# Patient Record
Sex: FEMALE | Race: BLACK OR AFRICAN AMERICAN | NOT HISPANIC OR LATINO | Employment: OTHER | URBAN - METROPOLITAN AREA
[De-identification: names, ages, dates, MRNs, and addresses within clinical notes are randomized per-mention and may not be internally consistent; named-entity substitution may affect disease eponyms.]

---

## 2017-05-08 ENCOUNTER — OFFICE VISIT (OUTPATIENT)
Dept: FAMILY MEDICINE CLINIC | Facility: CLINIC | Age: 62
End: 2017-05-08

## 2017-05-08 ENCOUNTER — RESULTS ENCOUNTER (OUTPATIENT)
Dept: FAMILY MEDICINE CLINIC | Facility: CLINIC | Age: 62
End: 2017-05-08

## 2017-05-08 DIAGNOSIS — E11.9 CONTROLLED TYPE 2 DIABETES MELLITUS WITHOUT COMPLICATION, WITHOUT LONG-TERM CURRENT USE OF INSULIN (HCC): ICD-10-CM

## 2017-05-08 DIAGNOSIS — E11.9 CONTROLLED TYPE 2 DIABETES MELLITUS WITHOUT COMPLICATION, WITHOUT LONG-TERM CURRENT USE OF INSULIN (HCC): Primary | ICD-10-CM

## 2017-05-08 DIAGNOSIS — I10 BENIGN ESSENTIAL HYPERTENSION: ICD-10-CM

## 2017-05-08 DIAGNOSIS — Z00.00 HEALTHCARE MAINTENANCE: ICD-10-CM

## 2017-05-08 DIAGNOSIS — E03.8 ADULT ONSET HYPOTHYROIDISM: ICD-10-CM

## 2017-05-08 PROCEDURE — 90736 HZV VACCINE LIVE SUBQ: CPT | Performed by: FAMILY MEDICINE

## 2017-05-08 PROCEDURE — 99203 OFFICE O/P NEW LOW 30 MIN: CPT | Performed by: FAMILY MEDICINE

## 2017-05-08 PROCEDURE — 90471 IMMUNIZATION ADMIN: CPT | Performed by: FAMILY MEDICINE

## 2017-05-08 RX ORDER — METOPROLOL SUCCINATE 50 MG/1
TABLET, EXTENDED RELEASE ORAL
COMMUNITY
Start: 2017-04-12 | End: 2017-11-13 | Stop reason: SDUPTHER

## 2017-05-08 RX ORDER — VALSARTAN 160 MG/1
TABLET ORAL
COMMUNITY
Start: 2017-03-16 | End: 2017-06-16 | Stop reason: SDUPTHER

## 2017-05-08 RX ORDER — METFORMIN HYDROCHLORIDE 750 MG/1
750 TABLET, EXTENDED RELEASE ORAL
COMMUNITY
End: 2019-06-05 | Stop reason: SDUPTHER

## 2017-05-08 RX ORDER — LEVOTHYROXINE SODIUM 137 UG/1
137 TABLET ORAL DAILY
COMMUNITY
End: 2017-06-19 | Stop reason: SDUPTHER

## 2017-05-08 RX ORDER — AMLODIPINE BESYLATE 10 MG/1
TABLET ORAL
COMMUNITY
Start: 2017-03-16 | End: 2017-06-16 | Stop reason: SDUPTHER

## 2017-05-08 RX ORDER — LANOLIN ALCOHOL/MO/W.PET/CERES
1000 CREAM (GRAM) TOPICAL DAILY
COMMUNITY
End: 2019-12-16

## 2017-05-08 RX ORDER — CHOLECALCIFEROL (VITAMIN D3) 125 MCG
1 CAPSULE ORAL DAILY
COMMUNITY

## 2017-05-08 RX ORDER — VITAMIN E 268 MG
400 CAPSULE ORAL DAILY
COMMUNITY
End: 2019-12-16

## 2017-05-09 VITALS
HEIGHT: 64 IN | BODY MASS INDEX: 32.44 KG/M2 | SYSTOLIC BLOOD PRESSURE: 130 MMHG | OXYGEN SATURATION: 99 % | HEART RATE: 62 BPM | WEIGHT: 190 LBS | DIASTOLIC BLOOD PRESSURE: 80 MMHG

## 2017-05-10 ENCOUNTER — TELEPHONE (OUTPATIENT)
Dept: FAMILY MEDICINE CLINIC | Facility: CLINIC | Age: 62
End: 2017-05-10

## 2017-05-15 ENCOUNTER — RESULTS ENCOUNTER (OUTPATIENT)
Dept: FAMILY MEDICINE CLINIC | Facility: CLINIC | Age: 62
End: 2017-05-15

## 2017-05-15 DIAGNOSIS — Z00.00 HEALTHCARE MAINTENANCE: ICD-10-CM

## 2017-05-15 DIAGNOSIS — I10 BENIGN ESSENTIAL HYPERTENSION: ICD-10-CM

## 2017-05-15 DIAGNOSIS — E11.9 CONTROLLED TYPE 2 DIABETES MELLITUS WITHOUT COMPLICATION, WITHOUT LONG-TERM CURRENT USE OF INSULIN (HCC): ICD-10-CM

## 2017-06-16 RX ORDER — AMLODIPINE BESYLATE 10 MG/1
10 TABLET ORAL DAILY
Qty: 90 TABLET | Refills: 0 | Status: SHIPPED | OUTPATIENT
Start: 2017-06-16 | End: 2017-09-19 | Stop reason: SDUPTHER

## 2017-06-16 RX ORDER — VALSARTAN 160 MG/1
160 TABLET ORAL DAILY
Qty: 90 TABLET | Refills: 0 | Status: SHIPPED | OUTPATIENT
Start: 2017-06-16 | End: 2017-09-19 | Stop reason: SDUPTHER

## 2017-06-19 RX ORDER — LEVOTHYROXINE SODIUM 137 UG/1
137 TABLET ORAL DAILY
Qty: 90 TABLET | Refills: 1 | Status: SHIPPED | OUTPATIENT
Start: 2017-06-19 | End: 2018-05-16 | Stop reason: SDUPTHER

## 2017-09-19 RX ORDER — AMLODIPINE BESYLATE 10 MG/1
TABLET ORAL
Qty: 90 TABLET | Refills: 0 | Status: SHIPPED | OUTPATIENT
Start: 2017-09-19 | End: 2017-12-29 | Stop reason: SDUPTHER

## 2017-09-19 RX ORDER — VALSARTAN 160 MG/1
TABLET ORAL
Qty: 90 TABLET | Refills: 0 | Status: SHIPPED | OUTPATIENT
Start: 2017-09-19 | End: 2017-12-29 | Stop reason: SDUPTHER

## 2017-11-13 ENCOUNTER — TELEPHONE (OUTPATIENT)
Dept: FAMILY MEDICINE CLINIC | Facility: CLINIC | Age: 62
End: 2017-11-13

## 2017-11-13 RX ORDER — METOPROLOL SUCCINATE 50 MG/1
50 TABLET, EXTENDED RELEASE ORAL DAILY
Qty: 30 TABLET | Refills: 0 | Status: SHIPPED | OUTPATIENT
Start: 2017-11-13 | End: 2017-12-04 | Stop reason: SDUPTHER

## 2017-11-13 NOTE — TELEPHONE ENCOUNTER
Patient called regarding blood pressure medication. She states that she does not have refills. Patient was informed that she needs an appointment for hypertension. A 30 day supply was sent in.

## 2017-12-04 ENCOUNTER — OFFICE VISIT (OUTPATIENT)
Dept: FAMILY MEDICINE CLINIC | Facility: CLINIC | Age: 62
End: 2017-12-04

## 2017-12-04 VITALS
HEART RATE: 75 BPM | WEIGHT: 190 LBS | BODY MASS INDEX: 32.44 KG/M2 | SYSTOLIC BLOOD PRESSURE: 114 MMHG | DIASTOLIC BLOOD PRESSURE: 78 MMHG | HEIGHT: 64 IN | OXYGEN SATURATION: 99 %

## 2017-12-04 DIAGNOSIS — K21.00 GASTROESOPHAGEAL REFLUX DISEASE WITH ESOPHAGITIS: ICD-10-CM

## 2017-12-04 DIAGNOSIS — I10 BENIGN ESSENTIAL HYPERTENSION: Primary | ICD-10-CM

## 2017-12-04 PROCEDURE — 90471 IMMUNIZATION ADMIN: CPT | Performed by: FAMILY MEDICINE

## 2017-12-04 PROCEDURE — 90686 IIV4 VACC NO PRSV 0.5 ML IM: CPT | Performed by: FAMILY MEDICINE

## 2017-12-04 PROCEDURE — 99213 OFFICE O/P EST LOW 20 MIN: CPT | Performed by: FAMILY MEDICINE

## 2017-12-04 RX ORDER — METOPROLOL SUCCINATE 50 MG/1
50 TABLET, EXTENDED RELEASE ORAL DAILY
Qty: 90 TABLET | Refills: 0 | Status: SHIPPED | OUTPATIENT
Start: 2017-12-04 | End: 2018-04-30 | Stop reason: SDUPTHER

## 2017-12-04 RX ORDER — RANITIDINE 300 MG/1
300 TABLET ORAL NIGHTLY
Qty: 90 TABLET | Refills: 1 | Status: SHIPPED | OUTPATIENT
Start: 2017-12-04 | End: 2018-03-13 | Stop reason: ALTCHOICE

## 2017-12-04 NOTE — PROGRESS NOTES
Micheline Cervantes is a 62 y.o. female.      Assessment/Plan   Problem List Items Addressed This Visit        Cardiovascular and Mediastinum    Benign essential hypertension - Primary    Overview     [facility] 5/8/2017  BP appears controlled on multiple medications.          Relevant Medications    metoprolol succinate XL (TOPROL-XL) 50 MG 24 hr tablet       Digestive    Gastroesophageal reflux disease with esophagitis    Overview     [facility] 12/4/2017  She recently was in the ER with this. Was begun on prevacid but she didn't take it due to concerns re her kidneys. Will try an H2 blocker.          Relevant Medications    raNITIdine (ZANTAC) 300 MG tablet             Return in about 6 months (around 6/4/2018) for RTO labs. .  Patient Instructions   She will add liquid antacid instead of tums.       Chief Complaint   Patient presents with   • Hypertension   • Med Refill     Social History   Substance Use Topics   • Smoking status: Never Smoker   • Smokeless tobacco: Never Used   • Alcohol use None       History of Present Illness     She sometimes gets a pain in her RUQ, comes and goes. Not related to meals. No N, V or diarrhea.     She has not yet had her labs done for HbA1c and others. She is eating healthily. She has gained some weight with her part-time job at night -- she comes home at night and eats.     She went to the ER with CP. She is sleeping sitting up. Didn't want to take the Prevacid because of her kidneys. She has gained some weight recently.     The following portions of the patient's history were reviewed and updated as appropriate:PMHroutine: Social history , Allergies, Current Medications, Active Problem List and Health Maintenance    Review of Systems   Constitutional: Negative for activity change, appetite change, chills, fatigue, fever and unexpected weight change.   HENT: Negative for congestion, ear pain, hearing loss, nosebleeds, rhinorrhea and sore throat.    Eyes: Negative for pain, redness and  "visual disturbance.   Respiratory: Negative for cough, shortness of breath and wheezing.    Cardiovascular: Negative for chest pain, palpitations and leg swelling.   Gastrointestinal: Negative for abdominal pain, blood in stool, constipation, diarrhea, nausea and vomiting.   Endocrine: Negative for cold intolerance and heat intolerance.   Genitourinary: Negative for difficulty urinating, dysuria, frequency, hematuria, pelvic pain, urgency and vaginal discharge.   Musculoskeletal: Negative for arthralgias, back pain and joint swelling.   Skin: Negative for rash and wound.   Neurological: Negative for dizziness, weakness, numbness and headaches.   Hematological: Does not bruise/bleed easily.   Psychiatric/Behavioral: Negative for dysphoric mood, sleep disturbance and suicidal ideas. The patient is not nervous/anxious.        Objective   Vitals:    12/04/17 1318   BP: 114/78   Pulse: 75   SpO2: 99%   Weight: 190 lb (86.2 kg)   Height: 64\" (162.6 cm)     Body mass index is 32.61 kg/(m^2).  Physical Exam   Constitutional: She is oriented to person, place, and time. Vital signs are normal. She appears well-developed and well-nourished. No distress.   HENT:   Head: Normocephalic.   Cardiovascular: Normal rate, regular rhythm and normal heart sounds.    Pulmonary/Chest: Effort normal and breath sounds normal.   Neurological: She is alert and oriented to person, place, and time. Gait normal.   Psychiatric: She has a normal mood and affect. Her behavior is normal. Judgment and thought content normal.   Vitals reviewed.    Reviewed Data:  No results found for any previous visit.      "

## 2017-12-12 DIAGNOSIS — Z00.00 PREVENTATIVE HEALTH CARE: ICD-10-CM

## 2017-12-12 DIAGNOSIS — E11.9 TYPE 2 DIABETES MELLITUS WITHOUT COMPLICATION, WITHOUT LONG-TERM CURRENT USE OF INSULIN (HCC): Primary | ICD-10-CM

## 2017-12-12 DIAGNOSIS — I10 ESSENTIAL HYPERTENSION, BENIGN: ICD-10-CM

## 2017-12-12 LAB
ALBUMIN SERPL-MCNC: 4.7 G/DL (ref 3.5–5.2)
ALBUMIN/GLOB SERPL: 1.7 G/DL
ALP SERPL-CCNC: 134 U/L (ref 39–117)
ALT SERPL-CCNC: 61 U/L (ref 1–33)
AST SERPL-CCNC: 33 U/L (ref 1–32)
BASOPHILS # BLD AUTO: 0.06 10*3/MM3 (ref 0–0.2)
BASOPHILS NFR BLD AUTO: 0.9 % (ref 0–1.5)
BILIRUB SERPL-MCNC: 0.3 MG/DL (ref 0.1–1.2)
BUN SERPL-MCNC: 10 MG/DL (ref 8–23)
BUN/CREAT SERPL: 16.9 (ref 7–25)
CALCIUM SERPL-MCNC: 9.9 MG/DL (ref 8.6–10.5)
CHLORIDE SERPL-SCNC: 103 MMOL/L (ref 98–107)
CHOLEST SERPL-MCNC: 223 MG/DL (ref 0–200)
CO2 SERPL-SCNC: 26.3 MMOL/L (ref 22–29)
CREAT SERPL-MCNC: 0.59 MG/DL (ref 0.57–1)
EOSINOPHIL # BLD AUTO: 0.21 10*3/MM3 (ref 0–0.7)
EOSINOPHIL NFR BLD AUTO: 3.3 % (ref 0.3–6.2)
ERYTHROCYTE [DISTWIDTH] IN BLOOD BY AUTOMATED COUNT: 12.6 % (ref 11.7–13)
GFR SERPLBLD CREATININE-BSD FMLA CKD-EPI: 103 ML/MIN/1.73
GFR SERPLBLD CREATININE-BSD FMLA CKD-EPI: 125 ML/MIN/1.73
GLOBULIN SER CALC-MCNC: 2.7 GM/DL
GLUCOSE SERPL-MCNC: 112 MG/DL (ref 65–99)
HBA1C MFR BLD: 6.4 % (ref 4.8–5.6)
HCT VFR BLD AUTO: 41.6 % (ref 35.6–45.5)
HDLC SERPL-MCNC: 65 MG/DL (ref 40–60)
HGB BLD-MCNC: 12.9 G/DL (ref 11.9–15.5)
IMM GRANULOCYTES # BLD: 0.03 10*3/MM3 (ref 0–0.03)
IMM GRANULOCYTES NFR BLD: 0.5 % (ref 0–0.5)
LDLC SERPL CALC-MCNC: 124 MG/DL (ref 0–100)
LDLC/HDLC SERPL: 1.9 {RATIO}
LYMPHOCYTES # BLD AUTO: 2.46 10*3/MM3 (ref 0.9–4.8)
LYMPHOCYTES NFR BLD AUTO: 38.2 % (ref 19.6–45.3)
MCH RBC QN AUTO: 31.9 PG (ref 26.9–32)
MCHC RBC AUTO-ENTMCNC: 31 G/DL (ref 32.4–36.3)
MCV RBC AUTO: 102.7 FL (ref 80.5–98.2)
MONOCYTES # BLD AUTO: 0.46 10*3/MM3 (ref 0.2–1.2)
MONOCYTES NFR BLD AUTO: 7.1 % (ref 5–12)
NEUTROPHILS # BLD AUTO: 3.22 10*3/MM3 (ref 1.9–8.1)
NEUTROPHILS NFR BLD AUTO: 50 % (ref 42.7–76)
PLATELET # BLD AUTO: 395 10*3/MM3 (ref 140–500)
POTASSIUM SERPL-SCNC: 4.2 MMOL/L (ref 3.5–5.2)
PROT SERPL-MCNC: 7.4 G/DL (ref 6–8.5)
RBC # BLD AUTO: 4.05 10*6/MM3 (ref 3.9–5.2)
SODIUM SERPL-SCNC: 142 MMOL/L (ref 136–145)
TRIGL SERPL-MCNC: 171 MG/DL (ref 0–150)
VLDLC SERPL CALC-MCNC: 34.2 MG/DL (ref 5–40)
WBC # BLD AUTO: 6.44 10*3/MM3 (ref 4.5–10.7)

## 2017-12-13 LAB — HCV AB S/CO SERPL IA: <0.1 S/CO RATIO (ref 0–0.9)

## 2017-12-29 RX ORDER — VALSARTAN 160 MG/1
TABLET ORAL
Qty: 90 TABLET | Refills: 0 | Status: SHIPPED | OUTPATIENT
Start: 2017-12-29 | End: 2018-04-08 | Stop reason: SDUPTHER

## 2017-12-29 RX ORDER — AMLODIPINE BESYLATE 10 MG/1
TABLET ORAL
Qty: 90 TABLET | Refills: 0 | Status: SHIPPED | OUTPATIENT
Start: 2017-12-29 | End: 2018-04-08 | Stop reason: SDUPTHER

## 2018-02-09 ENCOUNTER — OFFICE VISIT (OUTPATIENT)
Dept: FAMILY MEDICINE CLINIC | Facility: CLINIC | Age: 63
End: 2018-02-09

## 2018-02-09 VITALS
OXYGEN SATURATION: 98 % | DIASTOLIC BLOOD PRESSURE: 78 MMHG | WEIGHT: 196 LBS | BODY MASS INDEX: 33.46 KG/M2 | SYSTOLIC BLOOD PRESSURE: 120 MMHG | HEART RATE: 78 BPM | HEIGHT: 64 IN

## 2018-02-09 DIAGNOSIS — I10 BENIGN ESSENTIAL HYPERTENSION: ICD-10-CM

## 2018-02-09 DIAGNOSIS — K21.00 REFLUX ESOPHAGITIS: ICD-10-CM

## 2018-02-09 DIAGNOSIS — E78.2 MIXED HYPERLIPIDEMIA: ICD-10-CM

## 2018-02-09 DIAGNOSIS — E11.9 CONTROLLED TYPE 2 DIABETES MELLITUS WITHOUT COMPLICATION, WITHOUT LONG-TERM CURRENT USE OF INSULIN (HCC): ICD-10-CM

## 2018-02-09 DIAGNOSIS — E03.8 ADULT ONSET HYPOTHYROIDISM: Primary | ICD-10-CM

## 2018-02-09 PROCEDURE — 99214 OFFICE O/P EST MOD 30 MIN: CPT | Performed by: FAMILY MEDICINE

## 2018-02-09 NOTE — PROGRESS NOTES
Micheline Cervantes is a 63 y.o. female.      Assessment/Plan   Problem List Items Addressed This Visit        Cardiovascular and Mediastinum    Benign essential hypertension    Overview     [facility] 2/9/2018  BP appears controlled on multiple medications.          Mixed hyperlipidemia    Overview     [facility] 2/9/2018  Patient adamantly refuses to take a statin. She understands that this increases her risk of stroke and heart disease. She does have a good ratio.   Lab Results   Component Value Date/Time     (H) 12/12/2017 01:40 PM                 Endocrine    Adult onset hypothyroidism - Primary    [facility]     [facility]  Sees Dr. Yonatan Beckham for this but has not been seen in over a year. Will check TSH today.          Relevant Orders    TSH    Diabetes mellitus type 2, controlled    [facility]     [facility]  Reviewed labs. DM is controlled but she has gained weight. She understands the concern with this. She wishes to redouble her efforts to exercise and lose weight.  [facility] 5/8/2017  I have significant concerns that she is adament about never taking a statin with her DM and apparently high lipids as measured in the past. She is unaware of what her most recent A1c. Need old records and new labs.  OVERVIEW:  She has been dx'd for about 8 years.          Relevant Orders    Microalbumin / Creatinine Urine Ratio - Urine, Clean Catch      Other Visit Diagnoses     Reflux esophagitis        Relevant Orders    Ambulatory Referral to Gastroenterology             Return in about 6 months (around 8/9/2018) for lab with next visit.  There are no Patient Instructions on file for this visit.    Chief Complaint   Patient presents with   • Discuss Labs   • Heartburn     Social History   Substance Use Topics   • Smoking status: Never Smoker   • Smokeless tobacco: Never Used   • Alcohol use None       History of Present Illness     Subjective   Patient is here for follow-up of evaluation and treatment of, HTN,  "DM2, Hyperlipidemia and obesity    Diabetes Mellitus Type II    Current symptoms/problems include none and have been unchanged.   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 no  Is She on ACE inhibitor or angiotensin II receptor blocker? Yes     HYPERTENSION  She is not exercising and is adherent to a low-salt diet. Patient does not check BP at home.. Patient denies chest pain and dyspnea. Use of agents associated with hypertension: none. History of target organ damage: none. She is compliant with meds.      LIPIDS  LDL result does not yet meet goal, HDL high, triglycerides high, liver functions are mildly abnormal, but stable    Most recent labs  Lab Results   Component Value Date    HGBA1C 6.40 (H) 12/12/2017    CREATININE 0.59 12/12/2017     (H) 12/12/2017        The following portions of the patient's history were reviewed and updated as appropriate:PMHroutine: Social history , Allergies, Current Medications, Active Problem List and Health Maintenance    Review of Systems   Constitutional: Negative for activity change, appetite change, chills, fatigue, fever and unexpected weight change.   HENT: Negative for congestion, ear pain, hearing loss, nosebleeds, rhinorrhea and sore throat.    Eyes: Negative for pain, redness and visual disturbance.   Respiratory: Negative for cough, shortness of breath and wheezing.    Cardiovascular: Negative for chest pain, palpitations and leg swelling.   Gastrointestinal: Positive for abdominal pain. Negative for blood in stool, constipation, " "diarrhea, nausea and vomiting.   Endocrine: Negative for cold intolerance and heat intolerance.   Genitourinary: Negative for difficulty urinating, dysuria, frequency, hematuria, pelvic pain, urgency and vaginal discharge.   Musculoskeletal: Negative for arthralgias, back pain and joint swelling.   Skin: Negative for rash and wound.   Neurological: Negative for dizziness, weakness, numbness and headaches.   Hematological: Does not bruise/bleed easily.   Psychiatric/Behavioral: Negative for dysphoric mood, sleep disturbance and suicidal ideas. The patient is not nervous/anxious.        Objective   Vitals:    02/09/18 1440   BP: 120/78   Pulse: 78   SpO2: 98%   Weight: 88.9 kg (196 lb)   Height: 162.6 cm (64.02\")     Body mass index is 33.63 kg/(m^2).  Physical Exam   Constitutional: She is oriented to person, place, and time. Vital signs are normal. She appears well-developed and well-nourished. No distress.   HENT:   Head: Normocephalic.   Cardiovascular: Normal rate, regular rhythm and normal heart sounds.    Neurological: She is alert and oriented to person, place, and time. Gait normal.   Psychiatric: She has a normal mood and affect. Her behavior is normal. Judgment and thought content normal.   Vitals reviewed.    Reviewed Data:  No visits with results within 1 Month(s) from this visit.  Latest known visit with results is:    Orders Only on 12/12/2017   Component Date Value Ref Range Status   • WBC 12/12/2017 6.44  4.50 - 10.70 10*3/mm3 Final   • RBC 12/12/2017 4.05  3.90 - 5.20 10*6/mm3 Final   • Hemoglobin 12/12/2017 12.9  11.9 - 15.5 g/dL Final   • Hematocrit 12/12/2017 41.6  35.6 - 45.5 % Final   • MCV 12/12/2017 102.7* 80.5 - 98.2 fL Final   • MCH 12/12/2017 31.9  26.9 - 32.0 pg Final   • MCHC 12/12/2017 31.0* 32.4 - 36.3 g/dL Final   • RDW 12/12/2017 12.6  11.7 - 13.0 % Final   • Platelets 12/12/2017 395  140 - 500 10*3/mm3 Final   • Neutrophil Rel % 12/12/2017 50.0  42.7 - 76.0 % Final   • Lymphocyte Rel " % 12/12/2017 38.2  19.6 - 45.3 % Final   • Monocyte Rel % 12/12/2017 7.1  5.0 - 12.0 % Final   • Eosinophil Rel % 12/12/2017 3.3  0.3 - 6.2 % Final   • Basophil Rel % 12/12/2017 0.9  0.0 - 1.5 % Final   • Neutrophils Absolute 12/12/2017 3.22  1.90 - 8.10 10*3/mm3 Final   • Lymphocytes Absolute 12/12/2017 2.46  0.90 - 4.80 10*3/mm3 Final   • Monocytes Absolute 12/12/2017 0.46  0.20 - 1.20 10*3/mm3 Final   • Eosinophils Absolute 12/12/2017 0.21  0.00 - 0.70 10*3/mm3 Final   • Basophils Absolute 12/12/2017 0.06  0.00 - 0.20 10*3/mm3 Final   • Immature Granulocyte Rel % 12/12/2017 0.5  0.0 - 0.5 % Final   • Immature Grans Absolute 12/12/2017 0.03  0.00 - 0.03 10*3/mm3 Final   • Glucose 12/12/2017 112* 65 - 99 mg/dL Final   • BUN 12/12/2017 10  8 - 23 mg/dL Final   • Creatinine 12/12/2017 0.59  0.57 - 1.00 mg/dL Final   • eGFR Non African Am 12/12/2017 103  >60 mL/min/1.73 Final   • eGFR African Am 12/12/2017 125  >60 mL/min/1.73 Final   • BUN/Creatinine Ratio 12/12/2017 16.9  7.0 - 25.0 Final   • Sodium 12/12/2017 142  136 - 145 mmol/L Final   • Potassium 12/12/2017 4.2  3.5 - 5.2 mmol/L Final   • Chloride 12/12/2017 103  98 - 107 mmol/L Final   • Total CO2 12/12/2017 26.3  22.0 - 29.0 mmol/L Final   • Calcium 12/12/2017 9.9  8.6 - 10.5 mg/dL Final   • Total Protein 12/12/2017 7.4  6.0 - 8.5 g/dL Final   • Albumin 12/12/2017 4.70  3.50 - 5.20 g/dL Final   • Globulin 12/12/2017 2.7  gm/dL Final   • A/G Ratio 12/12/2017 1.7  g/dL Final   • Total Bilirubin 12/12/2017 0.3  0.1 - 1.2 mg/dL Final   • Alkaline Phosphatase 12/12/2017 134* 39 - 117 U/L Final   • AST (SGOT) 12/12/2017 33* 1 - 32 U/L Final   • ALT (SGPT) 12/12/2017 61* 1 - 33 U/L Final   • Total Cholesterol 12/12/2017 223* 0 - 200 mg/dL Final   • Triglycerides 12/12/2017 171* 0 - 150 mg/dL Final   • HDL Cholesterol 12/12/2017 65* 40 - 60 mg/dL Final   • VLDL Cholesterol 12/12/2017 34.2  5 - 40 mg/dL Final   • LDL Cholesterol  12/12/2017 124* 0 - 100 mg/dL Final    • LDL/HDL Ratio 12/12/2017 1.90   Final   • Hep C Virus Ab 12/12/2017 <0.1  0.0 - 0.9 s/co ratio Final    Comment:                                   Negative:     < 0.8                               Indeterminate: 0.8 - 0.9                                    Positive:     > 0.9   The CDC recommends that a positive HCV antibody result   be followed up with a HCV Nucleic Acid Amplification   test ([id number]).     • Hemoglobin A1C 12/12/2017 6.40* 4.80 - 5.60 % Final    Comment: Hemoglobin A1C Ranges:  Increased Risk for Diabetes  5.7% to 6.4%  Diabetes                     >= 6.5%  Diabetic Goal                < 7.0%

## 2018-02-10 LAB
ALBUMIN/CREAT UR: 15.6 MG/G CREAT (ref 0–30)
CREAT UR-MCNC: 256.1 MG/DL
MICROALBUMIN UR-MCNC: 40 UG/ML
TSH SERPL DL<=0.005 MIU/L-ACNC: 1.78 UIU/ML (ref 0.45–4.5)

## 2018-02-27 ENCOUNTER — OFFICE VISIT (OUTPATIENT)
Dept: GASTROENTEROLOGY | Facility: CLINIC | Age: 63
End: 2018-02-27

## 2018-02-27 VITALS
HEIGHT: 64 IN | DIASTOLIC BLOOD PRESSURE: 84 MMHG | TEMPERATURE: 98.2 F | BODY MASS INDEX: 33.09 KG/M2 | WEIGHT: 193.8 LBS | SYSTOLIC BLOOD PRESSURE: 130 MMHG

## 2018-02-27 DIAGNOSIS — R10.11 RUQ PAIN: ICD-10-CM

## 2018-02-27 DIAGNOSIS — R74.8 ELEVATED LIVER ENZYMES: ICD-10-CM

## 2018-02-27 DIAGNOSIS — K21.00 GASTROESOPHAGEAL REFLUX DISEASE WITH ESOPHAGITIS: Primary | ICD-10-CM

## 2018-02-27 PROCEDURE — 99204 OFFICE O/P NEW MOD 45 MIN: CPT | Performed by: INTERNAL MEDICINE

## 2018-03-13 ENCOUNTER — OFFICE VISIT (OUTPATIENT)
Dept: FAMILY MEDICINE CLINIC | Facility: CLINIC | Age: 63
End: 2018-03-13

## 2018-03-13 VITALS
RESPIRATION RATE: 16 BRPM | HEART RATE: 86 BPM | TEMPERATURE: 97.4 F | OXYGEN SATURATION: 97 % | BODY MASS INDEX: 32.27 KG/M2 | DIASTOLIC BLOOD PRESSURE: 82 MMHG | SYSTOLIC BLOOD PRESSURE: 136 MMHG | HEIGHT: 64 IN | WEIGHT: 189 LBS

## 2018-03-13 DIAGNOSIS — R05.3 PERSISTENT COUGH: Primary | ICD-10-CM

## 2018-03-13 PROCEDURE — 99213 OFFICE O/P EST LOW 20 MIN: CPT | Performed by: NURSE PRACTITIONER

## 2018-03-13 RX ORDER — METHYLPREDNISOLONE 4 MG/1
TABLET ORAL
Qty: 1 EACH | Refills: 0 | Status: SHIPPED | OUTPATIENT
Start: 2018-03-13 | End: 2018-04-16

## 2018-03-13 RX ORDER — BENZONATATE 100 MG/1
200 CAPSULE ORAL 3 TIMES DAILY PRN
Qty: 30 CAPSULE | Refills: 0 | Status: SHIPPED | OUTPATIENT
Start: 2018-03-13 | End: 2018-04-16

## 2018-03-13 NOTE — PROGRESS NOTES
"Micheline Cervantes is a 63 y.o. female. Pt admits to have sinus pressure, chest congestion and productive cough. She states she can ear wheezing when she breath. No fever,using corecidin HP with minimal success      Assessment/Plan   Problem List Items Addressed This Visit     None      Visit Diagnoses     Persistent cough    -  Primary      Dr. Blum pt       No Follow-up on file.  There are no Patient Instructions on file for this visit.    Chief Complaint   Patient presents with   • Cough   • Sinusitis     Social History   Substance Use Topics   • Smoking status: Never Smoker   • Smokeless tobacco: Never Used   • Alcohol use No       Cough   Associated symptoms include ear pain and wheezing. Pertinent negatives include no fever or shortness of breath.   Sinusitis   Associated symptoms include congestion, coughing, ear pain and sinus pressure. Pertinent negatives include no shortness of breath.        The following portions of the patient's history were reviewed and updated as appropriate:PMHroutine: Social history , Allergies and Current Medications    Review of Systems   Constitutional: Positive for activity change and appetite change. Negative for fatigue and fever.   HENT: Positive for congestion, ear pain, sinus pain and sinus pressure.    Respiratory: Positive for cough and wheezing. Negative for shortness of breath.        Objective   Vitals:    03/13/18 1459   BP: 136/82   BP Location: Left arm   Pulse: 86   Resp: 16   Temp: 97.4 °F (36.3 °C)   SpO2: 97%   Weight: 85.7 kg (189 lb)   Height: 162.6 cm (64\")     Body mass index is 32.44 kg/m².  Physical Exam   Constitutional: She appears well-developed and well-nourished. No distress.   HENT:   Head: Normocephalic and atraumatic.   Fluid to left and right ear  Op with drainage   Cardiovascular: Normal rate and regular rhythm.    Pulmonary/Chest: Effort normal and breath sounds normal.   Neurological: She is alert.   Nursing note and vitals reviewed.    Reviewed " Data:  No visits with results within 1 Month(s) from this visit.   Latest known visit with results is:   Office Visit on 02/09/2018   Component Date Value Ref Range Status   • TSH 02/10/2018 1.780  0.450 - 4.500 uIU/mL Final   • Creatinine, Urine 02/10/2018 256.1  Not Estab. mg/dL Final   • Microalbumin, Urine 02/10/2018 40.0  Not Estab. ug/mL Final   • Microalbumin/Creatinine Ratio 02/10/2018 15.6  0.0 - 30.0 mg/g creat Final

## 2018-04-09 RX ORDER — AMLODIPINE BESYLATE 10 MG/1
TABLET ORAL
Qty: 90 TABLET | Refills: 0 | Status: SHIPPED | OUTPATIENT
Start: 2018-04-09 | End: 2018-04-16

## 2018-04-09 RX ORDER — VALSARTAN 160 MG/1
TABLET ORAL
Qty: 90 TABLET | Refills: 0 | Status: SHIPPED | OUTPATIENT
Start: 2018-04-09 | End: 2018-04-16

## 2018-04-16 RX ORDER — VALSARTAN 160 MG/1
160 TABLET ORAL DAILY
COMMUNITY
End: 2018-07-12 | Stop reason: SDUPTHER

## 2018-04-16 RX ORDER — AMLODIPINE BESYLATE 10 MG/1
10 TABLET ORAL DAILY
COMMUNITY
End: 2018-07-12 | Stop reason: SDUPTHER

## 2018-04-17 ENCOUNTER — ANESTHESIA (OUTPATIENT)
Dept: GASTROENTEROLOGY | Facility: HOSPITAL | Age: 63
End: 2018-04-17

## 2018-04-17 ENCOUNTER — ANESTHESIA EVENT (OUTPATIENT)
Dept: GASTROENTEROLOGY | Facility: HOSPITAL | Age: 63
End: 2018-04-17

## 2018-04-17 ENCOUNTER — HOSPITAL ENCOUNTER (OUTPATIENT)
Facility: HOSPITAL | Age: 63
Setting detail: HOSPITAL OUTPATIENT SURGERY
Discharge: HOME | End: 2018-04-17
Attending: INTERNAL MEDICINE | Admitting: INTERNAL MEDICINE

## 2018-04-17 VITALS
OXYGEN SATURATION: 99 % | WEIGHT: 187.8 LBS | DIASTOLIC BLOOD PRESSURE: 90 MMHG | SYSTOLIC BLOOD PRESSURE: 164 MMHG | TEMPERATURE: 97.7 F | HEIGHT: 64 IN | HEART RATE: 51 BPM | BODY MASS INDEX: 32.06 KG/M2 | RESPIRATION RATE: 18 BRPM

## 2018-04-17 DIAGNOSIS — K21.00 GASTROESOPHAGEAL REFLUX DISEASE WITH ESOPHAGITIS: ICD-10-CM

## 2018-04-17 DIAGNOSIS — R10.11 RUQ PAIN: ICD-10-CM

## 2018-04-17 LAB
GLUCOSE BLDC GLUCOMTR-MCNC: 117 MG/DL (ref 70–130)
GLUCOSE BLDC GLUCOMTR-MCNC: 118 MG/DL (ref 70–130)

## 2018-04-17 PROCEDURE — 88312 SPECIAL STAINS GROUP 1: CPT | Performed by: INTERNAL MEDICINE

## 2018-04-17 PROCEDURE — 43239 EGD BIOPSY SINGLE/MULTIPLE: CPT | Performed by: INTERNAL MEDICINE

## 2018-04-17 PROCEDURE — 88305 TISSUE EXAM BY PATHOLOGIST: CPT | Performed by: INTERNAL MEDICINE

## 2018-04-17 PROCEDURE — 25010000002 PROPOFOL 10 MG/ML EMULSION: Performed by: ANESTHESIOLOGY

## 2018-04-17 PROCEDURE — 82962 GLUCOSE BLOOD TEST: CPT

## 2018-04-17 RX ORDER — SODIUM CHLORIDE 0.9 % (FLUSH) 0.9 %
1-10 SYRINGE (ML) INJECTION AS NEEDED
Status: DISCONTINUED | OUTPATIENT
Start: 2018-04-17 | End: 2018-04-17 | Stop reason: HOSPADM

## 2018-04-17 RX ORDER — PROPOFOL 10 MG/ML
VIAL (ML) INTRAVENOUS AS NEEDED
Status: DISCONTINUED | OUTPATIENT
Start: 2018-04-17 | End: 2018-04-17 | Stop reason: SURG

## 2018-04-17 RX ORDER — ESOMEPRAZOLE MAGNESIUM 40 MG/1
40 CAPSULE, DELAYED RELEASE ORAL DAILY
Qty: 30 CAPSULE | Refills: 11 | Status: SHIPPED | OUTPATIENT
Start: 2018-04-17 | End: 2018-10-23

## 2018-04-17 RX ORDER — SODIUM CHLORIDE, SODIUM LACTATE, POTASSIUM CHLORIDE, CALCIUM CHLORIDE 600; 310; 30; 20 MG/100ML; MG/100ML; MG/100ML; MG/100ML
30 INJECTION, SOLUTION INTRAVENOUS CONTINUOUS PRN
Status: DISCONTINUED | OUTPATIENT
Start: 2018-04-17 | End: 2018-04-17 | Stop reason: HOSPADM

## 2018-04-17 RX ORDER — PROPOFOL 10 MG/ML
VIAL (ML) INTRAVENOUS CONTINUOUS PRN
Status: DISCONTINUED | OUTPATIENT
Start: 2018-04-17 | End: 2018-04-17 | Stop reason: SURG

## 2018-04-17 RX ORDER — LIDOCAINE HYDROCHLORIDE 20 MG/ML
INJECTION, SOLUTION INFILTRATION; PERINEURAL AS NEEDED
Status: DISCONTINUED | OUTPATIENT
Start: 2018-04-17 | End: 2018-04-17 | Stop reason: SURG

## 2018-04-17 RX ADMIN — LIDOCAINE HYDROCHLORIDE 60 MG: 20 INJECTION, SOLUTION INFILTRATION; PERINEURAL at 08:00

## 2018-04-17 RX ADMIN — SODIUM CHLORIDE, POTASSIUM CHLORIDE, SODIUM LACTATE AND CALCIUM CHLORIDE 30 ML/HR: 600; 310; 30; 20 INJECTION, SOLUTION INTRAVENOUS at 07:51

## 2018-04-17 RX ADMIN — PROPOFOL 100 MCG/KG/MIN: 10 INJECTION, EMULSION INTRAVENOUS at 08:00

## 2018-04-17 RX ADMIN — PROPOFOL 50 MG: 10 INJECTION, EMULSION INTRAVENOUS at 08:05

## 2018-04-17 RX ADMIN — PROPOFOL 100 MG: 10 INJECTION, EMULSION INTRAVENOUS at 08:00

## 2018-04-17 NOTE — ANESTHESIA POSTPROCEDURE EVALUATION
"Patient: Micheline Cervantes    Procedure Summary     Date:  04/17/18 Room / Location:   [facility]    Anesthesia Start:  0801 Anesthesia Stop:  0820    Procedure:  ESOPHAGOGASTRODUODENOSCOPY with cold biopsies (N/A Esophagus) Diagnosis:       RUQ pain      Gastroesophageal reflux disease with esophagitis      (RUQ pain [R10.11])      (Gastroesophageal reflux disease with esophagitis [K21.0])    Surgeon:  Zev Ludwig MD Provider:  Andres Morris MD    Anesthesia Type:  MAC ASA Status:  3          Anesthesia Type: MAC  Last vitals  BP   143/89 (04/17/18 0820)   Temp   36.5 °C (97.7 °F) (04/17/18 0820)   Pulse   67 (04/17/18 0820)   Resp   18 (04/17/18 0820)     SpO2   98 % (04/17/18 0820)     Post Anesthesia Care and Evaluation    Patient location during evaluation: PACU  Patient participation: complete - patient participated  Level of consciousness: awake  Pain score: 0  Pain management: adequate  Airway patency: patent  Anesthetic complications: No anesthetic complications  PONV Status: none  Cardiovascular status: acceptable  Respiratory status: acceptable  Hydration status: acceptable    Comments: /89 (BP Location: Left arm, Patient Position: Lying)   Pulse 67   Temp 36.5 °C (97.7 °F) (Oral)   Resp 18   Ht 162.6 cm (64\")   Wt 85.2 kg (187 lb 12.8 oz)   SpO2 98%   BMI 32.24 kg/m²       "

## 2018-04-17 NOTE — H&P
Zev Ludwig MD Physician Signed   Progress Notes Encounter Date: 2/27/2018  3:15 PM   Related encounter: Office Visit from 2/27/2018 in [facility] with Zev Ludwig MD       Expand All Collapse All    []Manual[]Template  []Copied      Chief Complaint   Patient presents with   • Heartburn   • Abdominal Pain         Subjective          HPI      Micheline Cervantes is a 63 y.o. female who presents for evaluation of heartburn and abdominal pain.  Reflux worsening over last few months. Seems to be random throughout day but does not occur every day.  No noctural waking.  Started Zantac few weeks ago with no real improvement in sx.  Also reports some RUQ discomfort, sometimes exacerbated by bending over.  Better with rubbing.  No clear association with food intake.  Pt had colonoscopy at [facility] in 2016 which she recalls as normal.      Medical History        Past Medical History:   Diagnosis Date   • Diabetes mellitus     • GERD (gastroesophageal reflux disease)     • Hypertension     • Hypothyroidism              Current Outpatient Prescriptions:   •  amLODIPine (NORVASC) 10 MG tablet, TAKE ONE TABLET BY MOUTH DAILY, Disp: 90 tablet, Rfl: 0  •  Cholecalciferol (VITAMIN D3) 2000 UNITS tablet, Take  by mouth., Disp: , Rfl:   •  levothyroxine (SYNTHROID, LEVOTHROID) 137 MCG tablet, Take 1 tablet by mouth Daily., Disp: 90 tablet, Rfl: 1  •  metFORMIN ER (GLUCOPHAGE-XR) 750 MG 24 hr tablet, Take 750 mg by mouth Daily With Breakfast., Disp: , Rfl:   •  metoprolol succinate XL (TOPROL-XL) 50 MG 24 hr tablet, Take 1 tablet by mouth Daily., Disp: 90 tablet, Rfl: 0  •  Milk Thistle 250 MG capsule, Take  by mouth., Disp: , Rfl:   •  Multiple Vitamins-Minerals (MULTIVITAMIN ADULT PO), Take  by mouth., Disp: , Rfl:   •  raNITIdine (ZANTAC) 300 MG tablet, Take 1 tablet by mouth Every Night., Disp: 90 tablet, Rfl: 1  •  valsartan (DIOVAN) 160 MG tablet, TAKE ONE TABLET BY MOUTH DAILY,  Disp: 90 tablet, Rfl: 0  •  vitamin B-12 (CYANOCOBALAMIN) 1000 MCG tablet, Take 1,000 mcg by mouth Daily., Disp: , Rfl:   •  vitamin E 400 UNIT capsule, Take 400 Units by mouth Daily., Disp: , Rfl:            Allergies   Allergen Reactions   • Contrast Dye Nausea And Vomiting      Social History   Social History            Social History   • Marital status: Single       Spouse name: N/A   • Number of children: N/A   • Years of education: N/A      Occupational History   • Not on file.           Social History Main Topics   • Smoking status: Never Smoker   • Smokeless tobacco: Never Used   • Alcohol use No   • Drug use: No   • Sexual activity: Not on file           Other Topics Concern   • Not on file      Social History Narrative               Family History   Problem Relation Age of Onset   • Diabetes Father     • Hypertension Father     • Goiter Mother     • Colon polyps Mother     • Colon cancer Maternal Aunt     • Lung cancer Maternal Uncle        Review of Systems   Gastrointestinal: Positive for abdominal pain and nausea.        Reflux     All other systems reviewed and are negative.        Objective          Vitals:     02/27/18 1446   BP: 130/84   Temp: 98.2 °F (36.8 °C)      Physical Exam   Constitutional: She is oriented to person, place, and time. She appears well-developed and well-nourished.   HENT:   Head: Normocephalic and atraumatic.   Mouth/Throat: Oropharynx is clear and moist.   Eyes: EOM are normal. No scleral icterus.   Neck: Normal range of motion. Neck supple. No thyromegaly present.   Cardiovascular: Normal rate, regular rhythm and normal heart sounds.  Exam reveals no gallop and no friction rub.    No murmur heard.  Pulmonary/Chest: Effort normal and breath sounds normal. She has no wheezes. She has no rales. She exhibits no tenderness.   Abdominal: Soft. Bowel sounds are normal. She exhibits no distension. There is no tenderness. There is no rebound and no guarding. No hernia.   Obese    Musculoskeletal: Normal range of motion. She exhibits no edema.   Lymphadenopathy:     She has no cervical adenopathy.   Neurological: She is alert and oriented to person, place, and time.   Skin: Skin is warm and dry.   Psychiatric: She has a normal mood and affect. Judgment and thought content normal.   Vitals reviewed.        Assessment/Plan      Assessment:      1. Gastroesophageal reflux disease with esophagitis    2. RUQ pain    3. Elevated liver enzymes          Plan:   Case request for EGD  Trial fo Dexilant  Referral for RUQ u/s - suspect LFT elevation secondary to hepatic steatosis/NALFD          Zev Ludwig M.D.  [facility]  [address] [place] [address]  Office: [phone]

## 2018-04-17 NOTE — DISCHARGE INSTRUCTIONS
For the next 24 hours patient needs to be with a responsible adult.    For 24 hours DO NOT drive, operate machinery, appliances, drink alcohol, make important decisions or sign legal documents.    Start with a light or bland diet and advance to regular diet as tolerated.    Follow recommendations on procedure report provided by your doctor.    Call Dr edouard for problems [phone]    Problems may include but not limited to: large amounts of bleeding, trouble breathing, repeated vomiting, severe unrelieved pain, fever or chills.

## 2018-04-17 NOTE — ANESTHESIA PREPROCEDURE EVALUATION
Anesthesia Evaluation     Patient summary reviewed and Nursing notes reviewed                Airway   Mallampati: I  TM distance: <3 FB  Neck ROM: full  no difficulty expected  Dental - normal exam     Pulmonary - normal exam   (+) sleep apnea,   Cardiovascular - normal exam    (+) hypertension, hyperlipidemia,       Neuro/Psych- negative ROS  GI/Hepatic/Renal/Endo    (+)  GERD,  diabetes mellitus, hypothyroidism,     Musculoskeletal (-) negative ROS    Abdominal  - normal exam    Bowel sounds: normal.   Substance History - negative use     OB/GYN negative ob/gyn ROS         Other                        Anesthesia Plan    ASA 3     MAC     Anesthetic plan and risks discussed with patient.

## 2018-04-18 LAB
CYTO UR: NORMAL
LAB AP CASE REPORT: NORMAL
Lab: NORMAL
PATH REPORT.FINAL DX SPEC: NORMAL
PATH REPORT.GROSS SPEC: NORMAL

## 2018-04-23 NOTE — PROGRESS NOTES
Bx with some inflammation in esophagus as we expected  Continue Nexium for now  We await her RUQ u/s results  O/V in 4 weeks

## 2018-04-24 ENCOUNTER — HOSPITAL ENCOUNTER (OUTPATIENT)
Dept: ULTRASOUND IMAGING | Facility: HOSPITAL | Age: 63
Discharge: HOME | End: 2018-04-24
Attending: INTERNAL MEDICINE | Admitting: INTERNAL MEDICINE

## 2018-04-24 DIAGNOSIS — R74.8 ELEVATED LIVER ENZYMES: ICD-10-CM

## 2018-04-24 DIAGNOSIS — R10.11 RUQ PAIN: ICD-10-CM

## 2018-04-24 DIAGNOSIS — K21.00 GASTROESOPHAGEAL REFLUX DISEASE WITH ESOPHAGITIS: ICD-10-CM

## 2018-04-24 PROCEDURE — 76705 ECHO EXAM OF ABDOMEN: CPT

## 2018-04-26 ENCOUNTER — TELEPHONE (OUTPATIENT)
Dept: GASTROENTEROLOGY | Facility: CLINIC | Age: 63
End: 2018-04-26

## 2018-04-26 NOTE — TELEPHONE ENCOUNTER
----- Message from Zev Ludwig MD sent at 4/23/2018  1:08 PM EDT -----  Bx with some inflammation in esophagus as we expected  Continue Nexium for now  We await her RUQ u/s results  O/V in 4 weeks

## 2018-04-30 DIAGNOSIS — I10 BENIGN ESSENTIAL HYPERTENSION: ICD-10-CM

## 2018-04-30 RX ORDER — METOPROLOL SUCCINATE 50 MG/1
TABLET, EXTENDED RELEASE ORAL
Qty: 90 TABLET | Refills: 0 | Status: SHIPPED | OUTPATIENT
Start: 2018-04-30 | End: 2018-08-20

## 2018-04-30 RX ORDER — METOPROLOL SUCCINATE 50 MG/1
TABLET, EXTENDED RELEASE ORAL
Qty: 30 TABLET | Refills: 0 | Status: SHIPPED | OUTPATIENT
Start: 2018-04-30 | End: 2018-08-20

## 2018-05-16 RX ORDER — LEVOTHYROXINE SODIUM 137 UG/1
TABLET ORAL
Qty: 90 TABLET | Refills: 1 | Status: SHIPPED | OUTPATIENT
Start: 2018-05-16 | End: 2019-05-13 | Stop reason: SDUPTHER

## 2018-07-12 RX ORDER — AMLODIPINE BESYLATE 10 MG/1
TABLET ORAL
Qty: 90 TABLET | Refills: 0 | Status: SHIPPED | OUTPATIENT
Start: 2018-07-12 | End: 2018-10-27 | Stop reason: SDUPTHER

## 2018-07-12 RX ORDER — VALSARTAN 160 MG/1
TABLET ORAL
Qty: 90 TABLET | Refills: 0 | Status: SHIPPED | OUTPATIENT
Start: 2018-07-12 | End: 2018-08-08 | Stop reason: RX

## 2018-08-08 RX ORDER — IRBESARTAN 150 MG/1
150 TABLET ORAL NIGHTLY
Qty: 30 TABLET | Refills: 0 | Status: SHIPPED | OUTPATIENT
Start: 2018-08-08 | End: 2018-08-08 | Stop reason: SDUPTHER

## 2018-08-08 RX ORDER — IRBESARTAN 150 MG/1
150 TABLET ORAL NIGHTLY
Qty: 30 TABLET | Refills: 0 | Status: SHIPPED | OUTPATIENT
Start: 2018-08-08 | End: 2018-08-20

## 2018-08-20 ENCOUNTER — OFFICE VISIT (OUTPATIENT)
Dept: FAMILY MEDICINE CLINIC | Facility: CLINIC | Age: 63
End: 2018-08-20

## 2018-08-20 VITALS
SYSTOLIC BLOOD PRESSURE: 152 MMHG | HEART RATE: 61 BPM | WEIGHT: 187 LBS | HEIGHT: 64 IN | OXYGEN SATURATION: 98 % | DIASTOLIC BLOOD PRESSURE: 80 MMHG | BODY MASS INDEX: 31.92 KG/M2 | RESPIRATION RATE: 16 BRPM

## 2018-08-20 DIAGNOSIS — E11.9 CONTROLLED TYPE 2 DIABETES MELLITUS WITHOUT COMPLICATION, WITHOUT LONG-TERM CURRENT USE OF INSULIN (HCC): ICD-10-CM

## 2018-08-20 DIAGNOSIS — I10 BENIGN ESSENTIAL HYPERTENSION: Primary | ICD-10-CM

## 2018-08-20 LAB — HBA1C MFR BLD: 6.47 %

## 2018-08-20 PROCEDURE — 90715 TDAP VACCINE 7 YRS/> IM: CPT | Performed by: FAMILY MEDICINE

## 2018-08-20 PROCEDURE — 99214 OFFICE O/P EST MOD 30 MIN: CPT | Performed by: FAMILY MEDICINE

## 2018-08-20 PROCEDURE — 90472 IMMUNIZATION ADMIN EACH ADD: CPT | Performed by: FAMILY MEDICINE

## 2018-08-20 PROCEDURE — 90632 HEPA VACCINE ADULT IM: CPT | Performed by: FAMILY MEDICINE

## 2018-08-20 PROCEDURE — 90471 IMMUNIZATION ADMIN: CPT | Performed by: FAMILY MEDICINE

## 2018-08-20 PROCEDURE — 83036 HEMOGLOBIN GLYCOSYLATED A1C: CPT | Performed by: FAMILY MEDICINE

## 2018-08-20 RX ORDER — IRBESARTAN AND HYDROCHLOROTHIAZIDE 300; 12.5 MG/1; MG/1
1 TABLET, FILM COATED ORAL DAILY
Qty: 90 TABLET | Refills: 1 | Status: SHIPPED | OUTPATIENT
Start: 2018-08-20 | End: 2019-03-23 | Stop reason: SDUPTHER

## 2018-08-20 RX ORDER — VALSARTAN 160 MG/1
160 TABLET ORAL DAILY
Qty: 90 TABLET | Refills: 1 | Status: SHIPPED | OUTPATIENT
Start: 2018-08-20 | End: 2018-10-23

## 2018-08-20 RX ORDER — METOPROLOL TARTRATE 100 MG/1
100 TABLET ORAL
COMMUNITY
End: 2018-08-20 | Stop reason: ALTCHOICE

## 2018-08-20 RX ORDER — METOPROLOL SUCCINATE 100 MG/1
100 TABLET, EXTENDED RELEASE ORAL DAILY
Qty: 90 TABLET | Refills: 1 | Status: SHIPPED | OUTPATIENT
Start: 2018-08-20 | End: 2019-03-23 | Stop reason: SDUPTHER

## 2018-08-20 RX ORDER — VALSARTAN 160 MG/1
160 TABLET ORAL
COMMUNITY
End: 2018-08-20 | Stop reason: SDUPTHER

## 2018-08-20 NOTE — PROGRESS NOTES
Problem List Items Addressed This Visit        Cardiovascular and Mediastinum    Benign essential hypertension - Primary    Overview     [facility] 2/9/2018  BP appears controlled on multiple medications.          Relevant Medications    valsartan (DIOVAN) 160 MG tablet    irbesartan-hydrochlorothiazide (AVALIDE) 300-12.5 MG tablet    metoprolol succinate XL (TOPROL XL) 100 MG 24 hr tablet       Endocrine    Diabetes mellitus type 2, controlled (CMS/HCC)    Overview     [facility] 2/9/2018  Reviewed labs. DM is controlled but she has gained weight. She understands the concern with this. She wishes to redouble her efforts to exercise and lose weight.  [facility] 5/8/2017  I have significant concerns that she is adament about never taking a statin with her DM and apparently high lipids as measured in the past. She is unaware of what her most recent A1c. Need old records and new labs.  OVERVIEW:  She has been dx'd for about 8 years.          Current Assessment & Plan     [facility] 8/20/2018  Labs are drawn today.           Relevant Orders    POC Glycosylated Hemoglobin (Hb A1C)             Return in about 6 months (around 2/20/2019).    Micheline Cervantes is a 63 y.o. female being seen in our office today for Hypertension                 She  reports that she has never smoked. She has never used smokeless tobacco. She reports that she does not drink alcohol or use drugs.             HPI She had to switch medications due to recall on the valsartan. Also her metoprolol is not the right dose so needs that refilled as well. No CP or shortness of breath. Taking medications as ordered except her [employer] script did not go through.              The following portions of the patient's history were reviewed and updated as appropriate:PMHroutine: Social history , Allergies, Current Medications, Active Problem List and Health Maintenance            Review of Systems   Constitutional: Negative for activity change, appetite change, chills,  fatigue, fever and unexpected weight change.   HENT: Negative for congestion, ear pain, hearing loss, nosebleeds, rhinorrhea and sore throat.    Eyes: Negative for pain, redness and visual disturbance.   Respiratory: Negative for cough, shortness of breath and wheezing.    Cardiovascular: Negative for chest pain, palpitations and leg swelling.   Gastrointestinal: Negative for abdominal pain, blood in stool, constipation, diarrhea, nausea and vomiting.   Endocrine: Negative for cold intolerance and heat intolerance.   Genitourinary: Negative for difficulty urinating, dysuria, frequency, hematuria, pelvic pain, urgency and vaginal discharge.   Musculoskeletal: Negative for arthralgias, back pain and joint swelling.   Skin: Negative for rash and wound.   Neurological: Negative for dizziness, weakness, numbness and headaches.   Hematological: Does not bruise/bleed easily.   Psychiatric/Behavioral: Negative for dysphoric mood, sleep disturbance and suicidal ideas. The patient is not nervous/anxious.                  BP Readings from Last 1 Encounters:   08/20/18 152/80     Wt Readings from Last 3 Encounters:   08/20/18 84.8 kg (187 lb)   04/17/18 85.2 kg (187 lb 12.8 oz)   03/13/18 85.7 kg (189 lb)   Body mass index is 32.1 kg/m².                 Physical Exam   Constitutional: She is oriented to person, place, and time. Vital signs are normal. She appears well-developed and well-nourished. No distress.   HENT:   Head: Normocephalic.   Cardiovascular: Normal rate and regular rhythm.    Murmur (2/6 ngozi in RUSB) heard.  Pulmonary/Chest: Effort normal and breath sounds normal.   Neurological: She is alert and oriented to person, place, and time. Gait normal.   Psychiatric: She has a normal mood and affect. Her behavior is normal. Judgment and thought content normal.   Vitals reviewed.

## 2018-10-23 ENCOUNTER — OFFICE VISIT (OUTPATIENT)
Dept: FAMILY MEDICINE CLINIC | Facility: CLINIC | Age: 63
End: 2018-10-23

## 2018-10-23 VITALS
HEIGHT: 64 IN | OXYGEN SATURATION: 98 % | SYSTOLIC BLOOD PRESSURE: 132 MMHG | DIASTOLIC BLOOD PRESSURE: 78 MMHG | WEIGHT: 187 LBS | HEART RATE: 64 BPM | BODY MASS INDEX: 31.92 KG/M2

## 2018-10-23 DIAGNOSIS — R10.32 LEFT LOWER QUADRANT PAIN: Primary | ICD-10-CM

## 2018-10-23 PROCEDURE — 99213 OFFICE O/P EST LOW 20 MIN: CPT | Performed by: NURSE PRACTITIONER

## 2018-10-23 NOTE — PROGRESS NOTES
"Micheline Cervantes is a 63 y.o. female.Micheline has left sided abdominal pain for about two weeks. The pain is described as throbbing and mostly hurts at night. NKI. Nothing seems to aggravate the area. She has had this issue in the past and was told that she has a cyst.     Dr. Blum pt has had this pain in the past.      Assessment/Plan   Problem List Items Addressed This Visit     None      Visit Diagnoses     Left lower quadrant pain    -  Primary    Relevant Orders    CT Abdomen Pelvis Without Contrast             No Follow-up on file.  There are no Patient Instructions on file for this visit.    Chief Complaint   Patient presents with   • Abdominal Pain     Social History   Substance Use Topics   • Smoking status: Never Smoker   • Smokeless tobacco: Never Used   • Alcohol use No       History of Present Illness     The following portions of the patient's history were reviewed and updated as appropriate:PMHroutine: Social history , Allergies, Current Medications, Active Problem List and Health Maintenance    Review of Systems   Constitutional: Negative for activity change and appetite change.   Gastrointestinal: Positive for abdominal pain. Negative for constipation, diarrhea and vomiting.       Objective   Vitals:    10/23/18 1353   BP: 132/78   Pulse: 64   SpO2: 98%   Weight: 84.8 kg (187 lb)   Height: 162.6 cm (64\")     Body mass index is 32.1 kg/m².  Physical Exam   Constitutional: She appears well-developed and well-nourished. No distress.   HENT:   Head: Normocephalic and atraumatic.   Right Ear: External ear normal.   Left Ear: External ear normal.   Cardiovascular: Normal rate and regular rhythm.    Pulmonary/Chest: Effort normal and breath sounds normal.   Abdominal: Soft. Bowel sounds are normal. She exhibits no mass. There is no guarding.   Neurological: She is alert.   Nursing note and vitals reviewed.    Reviewed Data:  No visits with results within 1 Month(s) from this visit.   Latest known visit with " results is:   Office Visit on 08/20/2018   Component Date Value Ref Range Status   • Hemoglobin A1C 08/20/2018 6.47  % Final     Will call with ct results

## 2018-10-24 DIAGNOSIS — Z01.89 ROUTINE LAB DRAW: ICD-10-CM

## 2018-10-24 DIAGNOSIS — E78.2 MIXED HYPERLIPIDEMIA: Primary | ICD-10-CM

## 2018-10-24 DIAGNOSIS — E11.9 CONTROLLED TYPE 2 DIABETES MELLITUS WITHOUT COMPLICATION, WITHOUT LONG-TERM CURRENT USE OF INSULIN (HCC): ICD-10-CM

## 2018-10-29 ENCOUNTER — RESULTS ENCOUNTER (OUTPATIENT)
Dept: FAMILY MEDICINE CLINIC | Facility: CLINIC | Age: 63
End: 2018-10-29

## 2018-10-29 DIAGNOSIS — R10.32 LEFT LOWER QUADRANT PAIN: Primary | ICD-10-CM

## 2018-10-29 DIAGNOSIS — E78.2 MIXED HYPERLIPIDEMIA: ICD-10-CM

## 2018-10-29 DIAGNOSIS — E11.9 CONTROLLED TYPE 2 DIABETES MELLITUS WITHOUT COMPLICATION, WITHOUT LONG-TERM CURRENT USE OF INSULIN (HCC): ICD-10-CM

## 2018-10-29 DIAGNOSIS — Z01.89 ROUTINE LAB DRAW: ICD-10-CM

## 2018-10-29 RX ORDER — AMLODIPINE BESYLATE 10 MG/1
TABLET ORAL
Qty: 90 TABLET | Refills: 0 | Status: SHIPPED | OUTPATIENT
Start: 2018-10-29 | End: 2019-01-31 | Stop reason: SDUPTHER

## 2018-10-31 LAB
ALBUMIN SERPL-MCNC: 4.5 G/DL (ref 3.5–5.2)
ALBUMIN/GLOB SERPL: 1.7 G/DL
ALP SERPL-CCNC: 121 U/L (ref 39–117)
ALT SERPL-CCNC: 31 U/L (ref 1–33)
AST SERPL-CCNC: 22 U/L (ref 1–32)
BASOPHILS # BLD AUTO: 0.03 10*3/MM3 (ref 0–0.2)
BASOPHILS NFR BLD AUTO: 0.5 % (ref 0–1.5)
BILIRUB SERPL-MCNC: 0.3 MG/DL (ref 0.1–1.2)
BUN SERPL-MCNC: 10 MG/DL (ref 8–23)
BUN/CREAT SERPL: 15.6 (ref 7–25)
CALCIUM SERPL-MCNC: 9.8 MG/DL (ref 8.6–10.5)
CHLORIDE SERPL-SCNC: 103 MMOL/L (ref 98–107)
CHOLEST SERPL-MCNC: 212 MG/DL (ref 0–200)
CO2 SERPL-SCNC: 27.9 MMOL/L (ref 22–29)
CREAT SERPL-MCNC: 0.64 MG/DL (ref 0.57–1)
EOSINOPHIL # BLD AUTO: 0.22 10*3/MM3 (ref 0–0.7)
EOSINOPHIL NFR BLD AUTO: 3.6 % (ref 0.3–6.2)
ERYTHROCYTE [DISTWIDTH] IN BLOOD BY AUTOMATED COUNT: 12.2 % (ref 11.7–13)
GLOBULIN SER CALC-MCNC: 2.7 GM/DL
GLUCOSE SERPL-MCNC: 113 MG/DL (ref 65–99)
HBA1C MFR BLD: 6.1 % (ref 4.8–5.6)
HCT VFR BLD AUTO: 40 % (ref 35.6–45.5)
HDLC SERPL-MCNC: 53 MG/DL (ref 40–60)
HGB BLD-MCNC: 13.1 G/DL (ref 11.9–15.5)
IMM GRANULOCYTES # BLD: 0.02 10*3/MM3 (ref 0–0.03)
IMM GRANULOCYTES NFR BLD: 0.3 % (ref 0–0.5)
LDLC SERPL CALC-MCNC: 103 MG/DL (ref 0–100)
LDLC/HDLC SERPL: 1.95 {RATIO}
LYMPHOCYTES # BLD AUTO: 2.9 10*3/MM3 (ref 0.9–4.8)
LYMPHOCYTES NFR BLD AUTO: 47.2 % (ref 19.6–45.3)
MCH RBC QN AUTO: 32.9 PG (ref 26.9–32)
MCHC RBC AUTO-ENTMCNC: 32.8 G/DL (ref 32.4–36.3)
MCV RBC AUTO: 100.5 FL (ref 80.5–98.2)
MONOCYTES # BLD AUTO: 0.47 10*3/MM3 (ref 0.2–1.2)
MONOCYTES NFR BLD AUTO: 7.6 % (ref 5–12)
NEUTROPHILS # BLD AUTO: 2.53 10*3/MM3 (ref 1.9–8.1)
NEUTROPHILS NFR BLD AUTO: 41.1 % (ref 42.7–76)
PLATELET # BLD AUTO: 339 10*3/MM3 (ref 140–500)
POTASSIUM SERPL-SCNC: 3.9 MMOL/L (ref 3.5–5.2)
PROT SERPL-MCNC: 7.2 G/DL (ref 6–8.5)
RBC # BLD AUTO: 3.98 10*6/MM3 (ref 3.9–5.2)
SODIUM SERPL-SCNC: 143 MMOL/L (ref 136–145)
TRIGL SERPL-MCNC: 279 MG/DL (ref 0–150)
VLDLC SERPL CALC-MCNC: 55.8 MG/DL (ref 5–40)
WBC # BLD AUTO: 6.15 10*3/MM3 (ref 4.5–10.7)

## 2018-11-01 ENCOUNTER — TELEPHONE (OUTPATIENT)
Dept: FAMILY MEDICINE CLINIC | Facility: CLINIC | Age: 63
End: 2018-11-01

## 2018-11-06 DIAGNOSIS — R10.9 LEFT FLANK PAIN: Primary | ICD-10-CM

## 2018-11-13 DIAGNOSIS — R10.9 LEFT FLANK PAIN: Primary | ICD-10-CM

## 2019-01-31 RX ORDER — AMLODIPINE BESYLATE 10 MG/1
TABLET ORAL
Qty: 90 TABLET | Refills: 0 | Status: SHIPPED | OUTPATIENT
Start: 2019-01-31 | End: 2019-02-05 | Stop reason: SDUPTHER

## 2019-02-05 RX ORDER — AMLODIPINE BESYLATE 10 MG/1
TABLET ORAL
Qty: 90 TABLET | Refills: 0 | Status: SHIPPED | OUTPATIENT
Start: 2019-02-05 | End: 2019-04-04 | Stop reason: SDUPTHER

## 2019-03-23 DIAGNOSIS — I10 BENIGN ESSENTIAL HYPERTENSION: ICD-10-CM

## 2019-03-25 RX ORDER — METOPROLOL SUCCINATE 100 MG/1
TABLET, EXTENDED RELEASE ORAL
Qty: 30 TABLET | Refills: 0 | Status: SHIPPED | OUTPATIENT
Start: 2019-03-25 | End: 2019-04-04 | Stop reason: SDUPTHER

## 2019-03-25 RX ORDER — IRBESARTAN AND HYDROCHLOROTHIAZIDE 300; 12.5 MG/1; MG/1
TABLET, FILM COATED ORAL
Qty: 30 TABLET | Refills: 0 | Status: SHIPPED | OUTPATIENT
Start: 2019-03-25 | End: 2019-04-04 | Stop reason: SDUPTHER

## 2019-04-04 ENCOUNTER — OFFICE VISIT (OUTPATIENT)
Dept: FAMILY MEDICINE CLINIC | Facility: CLINIC | Age: 64
End: 2019-04-04

## 2019-04-04 VITALS
OXYGEN SATURATION: 100 % | HEART RATE: 57 BPM | DIASTOLIC BLOOD PRESSURE: 80 MMHG | BODY MASS INDEX: 30.56 KG/M2 | SYSTOLIC BLOOD PRESSURE: 126 MMHG | WEIGHT: 179 LBS | RESPIRATION RATE: 16 BRPM | HEIGHT: 64 IN

## 2019-04-04 DIAGNOSIS — E03.8 ADULT ONSET HYPOTHYROIDISM: ICD-10-CM

## 2019-04-04 DIAGNOSIS — I10 BENIGN ESSENTIAL HYPERTENSION: Primary | ICD-10-CM

## 2019-04-04 DIAGNOSIS — E11.8 CONTROLLED DIABETES MELLITUS TYPE 2 WITH COMPLICATIONS, UNSPECIFIED WHETHER LONG TERM INSULIN USE (HCC): ICD-10-CM

## 2019-04-04 PROCEDURE — 99213 OFFICE O/P EST LOW 20 MIN: CPT | Performed by: FAMILY MEDICINE

## 2019-04-04 RX ORDER — IRBESARTAN AND HYDROCHLOROTHIAZIDE 300; 12.5 MG/1; MG/1
1 TABLET, FILM COATED ORAL DAILY
Qty: 90 TABLET | Refills: 0 | Status: SHIPPED | OUTPATIENT
Start: 2019-04-04 | End: 2019-08-08 | Stop reason: SDUPTHER

## 2019-04-04 RX ORDER — METOPROLOL SUCCINATE 100 MG/1
100 TABLET, EXTENDED RELEASE ORAL DAILY
Qty: 90 TABLET | Refills: 0 | Status: SHIPPED | OUTPATIENT
Start: 2019-04-04 | End: 2019-08-08 | Stop reason: SDUPTHER

## 2019-04-04 RX ORDER — AMLODIPINE BESYLATE 10 MG/1
10 TABLET ORAL DAILY
Qty: 90 TABLET | Refills: 0 | Status: SHIPPED | OUTPATIENT
Start: 2019-04-04 | End: 2019-12-16 | Stop reason: SDUPTHER

## 2019-04-04 NOTE — PROGRESS NOTES
Assessment and Plan  Problem List Items Addressed This Visit        Cardiovascular and Mediastinum    Benign essential hypertension - Primary    Overview     [facility]  BP appears controlled on multiple medications.          Relevant Medications    amLODIPine (NORVASC) 10 MG tablet    irbesartan-hydrochlorothiazide (AVALIDE) 300-12.5 MG tablet    metoprolol succinate XL (TOPROL-XL) 100 MG 24 hr tablet       Endocrine    Adult onset hypothyroidism    Overview     [facility] 2/9/[facility]  Sees Dr. Yonatan Beckham for this but has not been seen in over a year. Will check TSH today.          Relevant Medications    metoprolol succinate XL (TOPROL-XL) 100 MG 24 hr tablet    Other Relevant Orders    TSH    Diabetes mellitus type 2, controlled (CMS/[facility])    Overview     [facility]  Reviewed labs. DM is controlled but she has gained weight. She understands the concern with this. She wishes to redouble her efforts to exercise and lose weight.  [facility] 5/8/2017  I have significant concerns that she is adament about never taking a statin with her DM and apparently high lipids as measured in the past. She is unaware of what her most recent A1c. Need old records and new labs.  OVERVIEW:  She has been dx'd for about 8 years.          Relevant Orders    Hemoglobin A1c    Microalbumin / Creatinine Urine Ratio - Urine, Clean Catch        F/U and Patient Instructions    Return in about 6 months (around 10/4/2019).  There are no Patient Instructions on file for this visit.    Subjective    Micheline Cervantes is a 64 y.o. female being seen in our office today for Hypertension and Mass (LT thumb, small)   Patient History              Social History  She  reports that she has never smoked. She has never used smokeless tobacco. She reports that she does not drink alcohol or use drugs.             History of the Present Illness  HPI Patient is here for follow-up of hypertension. She is not exercising and is adherent to a low-salt diet.  Patient does not check BP at home.. Patient denies chest pain and dyspnea. Cardiovascular risk factors: diabetes mellitus, dyslipidemia and hypertension. Use of agents associated with hypertension: thyroid hormones. History of target organ damage: none. She is compliant with meds.     Swollen area on her left thumb.   Significant Past History  The following portions of the patient's history were reviewed and updated as appropriate:PMHroutine: Social history , Allergies, Current Medications, Active Problem List and Health Maintenance          Review of Symptoms  Review of Systems   Constitutional: Negative for activity change, appetite change, chills, fatigue, fever and unexpected weight change.   HENT: Negative for congestion, ear pain, hearing loss, nosebleeds, rhinorrhea and sore throat.    Eyes: Negative for pain, redness and visual disturbance.   Respiratory: Negative for cough, shortness of breath and wheezing.    Cardiovascular: Negative for chest pain, palpitations and leg swelling.   Gastrointestinal: Negative for abdominal pain, blood in stool, constipation, diarrhea, nausea and vomiting.   Endocrine: Negative for cold intolerance and heat intolerance.   Genitourinary: Negative for difficulty urinating, dysuria, frequency, hematuria, pelvic pain, urgency and vaginal discharge.   Musculoskeletal: Negative for arthralgias, back pain and joint swelling.   Skin: Negative for rash and wound.   Neurological: Negative for dizziness, weakness, numbness and headaches.   Hematological: Does not bruise/bleed easily.   Psychiatric/Behavioral: Negative for dysphoric mood, sleep disturbance and suicidal ideas. The patient is not nervous/anxious.      Objective  Vital Signs         BP Readings from Last 1 Encounters:   04/04/19 126/80     Wt Readings from Last 3 Encounters:   04/04/19 81.2 kg (179 lb)   10/23/18 84.8 kg (187 lb)   09/13/18 79.4 kg (175 lb)   Body mass index is 30.73 kg/m².          Physical Exam    Physical Exam   Constitutional: She is oriented to person, place, and time. Vital signs are normal. She appears well-developed and well-nourished. No distress.   HENT:   Head: Normocephalic.   Cardiovascular: Normal rate, regular rhythm and normal heart sounds.   Pulmonary/Chest: Effort normal and breath sounds normal.    Micheline had a diabetic foot exam performed today.   During the foot exam she had a monofilament test performed.    Neurological Sensory Findings - Unaltered hot/cold right ankle/foot discrimination and unaltered hot/cold left ankle/foot discrimination. Unaltered sharp/dull right ankle/foot discrimination and unaltered sharp/dull left ankle/foot discrimination. No right ankle/foot altered proprioception and no left ankle/foot altered proprioception  Vascular Status -  Her right foot exhibits normal foot vasculature  and no edema. Her left foot exhibits normal foot vasculature  and no edema.  Skin Integrity  -  Her right foot skin is intact.Her left foot skin is intact..   Foot Structure and Biomechanics -  Her right foot has no hallux valgus, no pes cavus, no claw toes, no hammer toes and no cavovarus present. Her left foot has no hallux valgus, no pes cavus, no claw toes, no hammer toes and no cavovarus.  Neurological: She is alert and oriented to person, place, and time. Gait normal.   Psychiatric: She has a normal mood and affect. Her behavior is normal. Judgment and thought content normal.   Vitals reviewed.

## 2019-04-05 LAB
ALBUMIN/CREAT UR: 16.2 MG/G CREAT (ref 0–30)
CREAT UR-MCNC: 219.1 MG/DL
HBA1C MFR BLD: 6.5 % (ref 4.8–5.6)
MICROALBUMIN UR-MCNC: 35.4 UG/ML
TSH SERPL DL<=0.005 MIU/L-ACNC: 1.73 MIU/ML (ref 0.27–4.2)

## 2019-05-13 RX ORDER — LEVOTHYROXINE SODIUM 137 UG/1
TABLET ORAL
Qty: 90 TABLET | Refills: 1 | Status: SHIPPED | OUTPATIENT
Start: 2019-05-13 | End: 2020-02-17

## 2019-06-05 RX ORDER — METFORMIN HYDROCHLORIDE 750 MG/1
750 TABLET, EXTENDED RELEASE ORAL
Qty: 90 TABLET | Refills: 1 | Status: SHIPPED | OUTPATIENT
Start: 2019-06-05 | End: 2020-02-19

## 2019-08-08 DIAGNOSIS — I10 BENIGN ESSENTIAL HYPERTENSION: ICD-10-CM

## 2019-08-08 RX ORDER — IRBESARTAN AND HYDROCHLOROTHIAZIDE 300; 12.5 MG/1; MG/1
TABLET, FILM COATED ORAL
Qty: 90 TABLET | Refills: 0 | Status: SHIPPED | OUTPATIENT
Start: 2019-08-08 | End: 2019-11-21 | Stop reason: SDUPTHER

## 2019-08-08 RX ORDER — METOPROLOL SUCCINATE 100 MG/1
TABLET, EXTENDED RELEASE ORAL
Qty: 90 TABLET | Refills: 0 | Status: SHIPPED | OUTPATIENT
Start: 2019-08-08 | End: 2019-11-21 | Stop reason: SDUPTHER

## 2019-11-21 DIAGNOSIS — I10 BENIGN ESSENTIAL HYPERTENSION: ICD-10-CM

## 2019-11-21 RX ORDER — METOPROLOL SUCCINATE 100 MG/1
TABLET, EXTENDED RELEASE ORAL
Qty: 30 TABLET | Refills: 0 | Status: SHIPPED | OUTPATIENT
Start: 2019-11-21 | End: 2019-12-16 | Stop reason: SDUPTHER

## 2019-11-21 RX ORDER — IRBESARTAN AND HYDROCHLOROTHIAZIDE 300; 12.5 MG/1; MG/1
TABLET, FILM COATED ORAL
Qty: 30 TABLET | Refills: 0 | Status: SHIPPED | OUTPATIENT
Start: 2019-11-21 | End: 2019-12-16 | Stop reason: SDUPTHER

## 2019-11-22 ENCOUNTER — TELEPHONE (OUTPATIENT)
Dept: FAMILY MEDICINE CLINIC | Facility: CLINIC | Age: 64
End: 2019-11-22

## 2019-11-22 NOTE — TELEPHONE ENCOUNTER
----- Message from Vianca Dunn MA sent at 11/21/2019 11:37 AM EST -----  Patient needs appt for BP refills 30 day refills sent to pharmacy please call and schedule patient within the next 30 days

## 2019-12-16 ENCOUNTER — OFFICE VISIT (OUTPATIENT)
Dept: FAMILY MEDICINE CLINIC | Facility: CLINIC | Age: 64
End: 2019-12-16

## 2019-12-16 VITALS
WEIGHT: 187 LBS | OXYGEN SATURATION: 96 % | RESPIRATION RATE: 14 BRPM | HEIGHT: 64 IN | HEART RATE: 57 BPM | SYSTOLIC BLOOD PRESSURE: 160 MMHG | DIASTOLIC BLOOD PRESSURE: 78 MMHG | BODY MASS INDEX: 31.92 KG/M2

## 2019-12-16 DIAGNOSIS — R53.83 MALAISE AND FATIGUE: ICD-10-CM

## 2019-12-16 DIAGNOSIS — Z86.69 HISTORY OF SLEEP APNEA: ICD-10-CM

## 2019-12-16 DIAGNOSIS — E11.9 CONTROLLED TYPE 2 DIABETES MELLITUS WITHOUT COMPLICATION, WITHOUT LONG-TERM CURRENT USE OF INSULIN (HCC): ICD-10-CM

## 2019-12-16 DIAGNOSIS — R53.81 MALAISE AND FATIGUE: ICD-10-CM

## 2019-12-16 DIAGNOSIS — E78.2 MIXED HYPERLIPIDEMIA: ICD-10-CM

## 2019-12-16 DIAGNOSIS — Z01.89 ROUTINE LAB DRAW: ICD-10-CM

## 2019-12-16 DIAGNOSIS — I10 BENIGN ESSENTIAL HYPERTENSION: Primary | ICD-10-CM

## 2019-12-16 PROCEDURE — 90471 IMMUNIZATION ADMIN: CPT | Performed by: FAMILY MEDICINE

## 2019-12-16 PROCEDURE — 90674 CCIIV4 VAC NO PRSV 0.5 ML IM: CPT | Performed by: FAMILY MEDICINE

## 2019-12-16 PROCEDURE — 99214 OFFICE O/P EST MOD 30 MIN: CPT | Performed by: FAMILY MEDICINE

## 2019-12-16 RX ORDER — AMLODIPINE BESYLATE 10 MG/1
10 TABLET ORAL DAILY
Qty: 90 TABLET | Refills: 1 | Status: SHIPPED | OUTPATIENT
Start: 2019-12-16 | End: 2020-06-19 | Stop reason: SDUPTHER

## 2019-12-16 RX ORDER — IRBESARTAN AND HYDROCHLOROTHIAZIDE 300; 12.5 MG/1; MG/1
1 TABLET, FILM COATED ORAL DAILY
Qty: 90 TABLET | Refills: 1 | Status: SHIPPED | OUTPATIENT
Start: 2019-12-16 | End: 2020-06-19 | Stop reason: SDUPTHER

## 2019-12-16 RX ORDER — METOPROLOL SUCCINATE 100 MG/1
100 TABLET, EXTENDED RELEASE ORAL DAILY
Qty: 90 TABLET | Refills: 1 | Status: SHIPPED | OUTPATIENT
Start: 2019-12-16 | End: 2020-06-19 | Stop reason: SDUPTHER

## 2019-12-16 NOTE — PROGRESS NOTES
ASSESSMENT AND PLAN    Problem List Items Addressed This Visit        Cardiovascular and Mediastinum    Benign essential hypertension - Primary    Overview     [facility] 12/20/2019 BP is not controlled today on maximal medications. She is not treating her sleep apnea symptoms and this may be adding to her BP issues. She is agreeable to having retesting done.          Relevant Medications    amLODIPine (NORVASC) 10 MG tablet    irbesartan-hydrochlorothiazide (AVALIDE) 300-12.5 MG tablet    metoprolol succinate XL (TOPROL-XL) 100 MG 24 hr tablet    Mixed hyperlipidemia    Overview     [facility] 12/20/2019  Patient adamantly refuses to take a statin. She understands that this increases her risk of stroke and heart disease. She does have a good ratio.   Lab Results   Component Value Date/Time     (H) 12/16/2019 04:05 PM              Relevant Orders    Lipid Panel With LDL / HDL Ratio (Completed)       Endocrine    Diabetes mellitus type 2, controlled (CMS/[facility])    Overview     [facility] 12/16/19 Weight is up, BP is up most likely DM will not be as well controlled.   2/9/2018  Reviewed labs. DM is controlled but she has gained weight. She understands the concern with this. She wishes to redouble her efforts to exercise and lose weight.  [facility] 5/8/2017  I have significant concerns that she is adament about never taking a statin with her DM and apparently high lipids as measured in the past. She is unaware of what her most recent A1c. Need old records and new labs.          Relevant Orders    Hemoglobin A1c (Completed)    Ambulatory Referral for Diabetic Eye Exam-Ophthalmology      Other Visit Diagnoses     Routine lab draw        Relevant Orders    Hemoglobin A1c (Completed)    CBC (No Diff) (Completed)    Comprehensive Metabolic Panel (Completed)    Lipid Panel With LDL / HDL Ratio (Completed)    History of sleep apnea        Relevant Orders    Ambulatory Referral to Sleep Medicine    Malaise and fatigue         She falls asleep just waiting or sitting somewhere. Granddaughter says she snores significantly. Was on CPAP in past. Couldn't tolerate the mask.             Return in about 6 months (around 6/16/2020).  Patient was given instructions and counseling regarding her condition or for health maintenance advice. Please see specific information pulled into the AVS by me.      The 10-year ASCVD risk score (Fely SCOTT Jr., et al., 2013) is: 34.4%    Values used to calculate the score:      Age: 64 years      Sex: Female      Is Non- : Yes      Diabetic: Yes      Tobacco smoker: No      Systolic Blood Pressure: 160 mmHg      Is BP treated: Yes      HDL Cholesterol: 59 mg/dL      Total Cholesterol: 227 mg/dL      SUBJECTIVE  Micheline Cervantes is a 64 y.o. female being seen in our office today for Fatigue; Hypertension; look at surgery scar; and Muscle Pain               Social History  She  reports that she has never smoked. She has never used smokeless tobacco. She reports that she does not drink alcohol or use drugs.    History of the Present Illness   HPI   Subjective   Patient is here for follow-up of evaluation and treatment of, HTN, DM2, Hyperlipidemia and obesity    Diabetes Mellitus Type II    Current symptoms/problems include none and have been stable.   Known diabetic complications: none  Cardiovascular risk factors: dyslipidemia, hypertension, sedentary lifestyle  Eye exam current (within one year): no  Foot exam current (within one year) yes  Weight trend: increasing  Current diet: not asked  Current exercise:none  Current monitoring regimen: office lab tests - two times yearly  Is She on ACE inhibitor or angiotensin II receptor blocker? Yes     HYPERTENSION  She is not exercising and is not adherent to a low-salt diet. Patient does not check BP at home.. Patient denies chest pain and dyspnea. Use of agents associated with hypertension: none. History of target organ damage: none. She is  compliant with meds.      LIPIDS  Labs are drawn today    Most recent labs  Lab Results   Component Value Date    HGBA1C 6.7 (H) 12/16/2019    HGBA1C 6.50 (H) 04/04/2019    HGBA1C 6.10 (H) 10/30/2018    CREATININE 0.65 12/16/2019     (H) 12/16/2019    MICROALBUR 35.4 04/04/2019     Significant Past History  The following portions of the patient's history were reviewed and updated as appropriate:PMHroutine: Social history , Allergies, Current Medications, Active Problem List and Health Maintenance    Review of Systems   Constitutional: Negative for activity change, appetite change, chills, fatigue, fever and unexpected weight change.   HENT: Negative for congestion, ear pain, hearing loss, nosebleeds, rhinorrhea and sore throat.    Eyes: Negative for pain, redness and visual disturbance.   Respiratory: Negative for cough, shortness of breath and wheezing.    Cardiovascular: Negative for chest pain, palpitations and leg swelling.   Gastrointestinal: Negative for abdominal pain, blood in stool, constipation, diarrhea, nausea and vomiting.   Endocrine: Negative for cold intolerance and heat intolerance.   Genitourinary: Negative for difficulty urinating, dysuria, frequency, hematuria, pelvic pain, urgency and vaginal discharge.   Musculoskeletal: Negative for arthralgias, back pain and joint swelling.   Skin: Negative for rash and wound.   Neurological: Negative for dizziness, weakness, numbness and headaches.   Hematological: Does not bruise/bleed easily.   Psychiatric/Behavioral: Negative for dysphoric mood, sleep disturbance and suicidal ideas. The patient is not nervous/anxious.    I have reviewed the ROS as documented by the MA. Mariela Blum MD      OBJECTIVE   Vital Signs          BP Readings from Last 1 Encounters:   12/16/19 160/78     Wt Readings from Last 3 Encounters:   12/16/19 84.8 kg (187 lb)   04/04/19 81.2 kg (179 lb)   10/23/18 84.8 kg (187 lb)   Body mass index is 32.1 kg/m².     Physical  Exam   Constitutional: She is oriented to person, place, and time. Vital signs are normal. She appears well-developed and well-nourished. No distress.   HENT:   Head: Normocephalic.   Cardiovascular: Normal rate, regular rhythm and normal heart sounds.   Pulmonary/Chest: Effort normal and breath sounds normal.   Neurological: She is alert and oriented to person, place, and time. Gait normal.   Psychiatric: She has a normal mood and affect. Her behavior is normal. Judgment and thought content normal.   Vitals reviewed.    Data Reviewed

## 2019-12-17 LAB
ALBUMIN SERPL-MCNC: 4.5 G/DL (ref 3.6–4.8)
ALBUMIN/GLOB SERPL: 1.6 {RATIO} (ref 1.2–2.2)
ALP SERPL-CCNC: 127 IU/L (ref 39–117)
ALT SERPL-CCNC: 32 IU/L (ref 0–32)
AST SERPL-CCNC: 21 IU/L (ref 0–40)
BILIRUB SERPL-MCNC: 0.5 MG/DL (ref 0–1.2)
BUN SERPL-MCNC: 14 MG/DL (ref 8–27)
BUN/CREAT SERPL: 22 (ref 12–28)
CALCIUM SERPL-MCNC: 10 MG/DL (ref 8.7–10.3)
CHLORIDE SERPL-SCNC: 106 MMOL/L (ref 96–106)
CHOLEST SERPL-MCNC: 227 MG/DL (ref 100–199)
CO2 SERPL-SCNC: 24 MMOL/L (ref 20–29)
CREAT SERPL-MCNC: 0.65 MG/DL (ref 0.57–1)
ERYTHROCYTE [DISTWIDTH] IN BLOOD BY AUTOMATED COUNT: 12.8 % (ref 12.3–15.4)
GLOBULIN SER CALC-MCNC: 2.8 G/DL (ref 1.5–4.5)
GLUCOSE SERPL-MCNC: 111 MG/DL (ref 65–99)
HBA1C MFR BLD: 6.7 % (ref 4.8–5.6)
HCT VFR BLD AUTO: 37.1 % (ref 34–46.6)
HDLC SERPL-MCNC: 59 MG/DL
HGB BLD-MCNC: 12.5 G/DL (ref 11.1–15.9)
LDLC SERPL CALC-MCNC: 149 MG/DL (ref 0–99)
LDLC/HDLC SERPL: 2.5 RATIO (ref 0–3.2)
MCH RBC QN AUTO: 32.2 PG (ref 26.6–33)
MCHC RBC AUTO-ENTMCNC: 33.7 G/DL (ref 31.5–35.7)
MCV RBC AUTO: 96 FL (ref 79–97)
PLATELET # BLD AUTO: 408 X10E3/UL (ref 150–450)
POTASSIUM SERPL-SCNC: 4.1 MMOL/L (ref 3.5–5.2)
PROT SERPL-MCNC: 7.3 G/DL (ref 6–8.5)
RBC # BLD AUTO: 3.88 X10E6/UL (ref 3.77–5.28)
SODIUM SERPL-SCNC: 144 MMOL/L (ref 134–144)
TRIGL SERPL-MCNC: 93 MG/DL (ref 0–149)
VLDLC SERPL CALC-MCNC: 19 MG/DL (ref 5–40)
WBC # BLD AUTO: 5.8 X10E3/UL (ref 3.4–10.8)

## 2020-01-02 ENCOUNTER — TELEPHONE (OUTPATIENT)
Dept: FAMILY MEDICINE CLINIC | Facility: CLINIC | Age: 65
End: 2020-01-02

## 2020-01-02 DIAGNOSIS — E78.2 MIXED HYPERLIPIDEMIA: Primary | ICD-10-CM

## 2020-01-02 DIAGNOSIS — E11.9 CONTROLLED TYPE 2 DIABETES MELLITUS WITHOUT COMPLICATION, WITHOUT LONG-TERM CURRENT USE OF INSULIN (HCC): ICD-10-CM

## 2020-01-02 NOTE — TELEPHONE ENCOUNTER
----- Message from Vianca Dunn MA sent at 12/30/2019  3:22 PM EST -----  Regarding: FW: How is BP doing?      ----- Message -----  From: Mariela Blum MD  Sent: 12/30/2019  To: Vianca Dunn MA  Subject: How is BP doing?                                 Patient is supposed to be checking

## 2020-01-02 NOTE — TELEPHONE ENCOUNTER
Pt has been checking daily and states that her bp numbers have been normal.    Pt will report back to me some readings next week

## 2020-02-04 ENCOUNTER — HOSPITAL ENCOUNTER (OUTPATIENT)
Dept: DIABETES SERVICES | Facility: HOSPITAL | Age: 65
Setting detail: RECURRING SERIES
Discharge: HOME | End: 2020-02-04

## 2020-02-04 PROCEDURE — G0109 DIAB MANAGE TRN IND/GROUP: HCPCS

## 2020-02-10 ENCOUNTER — TELEPHONE (OUTPATIENT)
Dept: FAMILY MEDICINE CLINIC | Facility: CLINIC | Age: 65
End: 2020-02-10

## 2020-02-10 NOTE — TELEPHONE ENCOUNTER
Patient called and stated she wanted to let  know she made her eye doctor appointment with  March 27th at 10:40.    If any questions or concerns please call patient back at [phone]    Please advise

## 2020-02-11 ENCOUNTER — HOSPITAL ENCOUNTER (OUTPATIENT)
Dept: DIABETES SERVICES | Facility: HOSPITAL | Age: 65
Setting detail: RECURRING SERIES
Discharge: HOME | End: 2020-02-11

## 2020-02-11 PROCEDURE — G0109 DIAB MANAGE TRN IND/GROUP: HCPCS | Performed by: DIETITIAN, REGISTERED

## 2020-02-12 ENCOUNTER — OFFICE VISIT (OUTPATIENT)
Dept: NEUROLOGY | Facility: CLINIC | Age: 65
End: 2020-02-12

## 2020-02-12 VITALS
DIASTOLIC BLOOD PRESSURE: 80 MMHG | HEART RATE: 57 BPM | HEIGHT: 64 IN | BODY MASS INDEX: 31.58 KG/M2 | OXYGEN SATURATION: 97 % | WEIGHT: 185 LBS | SYSTOLIC BLOOD PRESSURE: 118 MMHG

## 2020-02-12 DIAGNOSIS — G47.33 OBSTRUCTIVE SLEEP APNEA: Primary | ICD-10-CM

## 2020-02-12 PROCEDURE — 99205 OFFICE O/P NEW HI 60 MIN: CPT | Performed by: PSYCHIATRY & NEUROLOGY

## 2020-02-12 RX ORDER — VITAMIN B COMPLEX
CAPSULE ORAL DAILY
COMMUNITY

## 2020-02-12 RX ORDER — MULTIVITAMIN WITH IRON
TABLET ORAL
COMMUNITY

## 2020-02-12 NOTE — PROGRESS NOTES
Subjective:     Patient ID: Micheline Cervantes is a 65 y.o. female.    Ms. Odonnell is a 65 year old right handed female with a h/o asthma, HTN, DM, SAAD and hypothyroidism who is seen in consultation at the request of Dr. Blum for the evaluation of sleep apnea.  I reviewed the referring providers note from December 16, 2019.  He reports that the patient has hypertension and hyperlipidemia.  He also reports diabetes and a history of sleep apnea.  The patient falls asleep just waiting or sitting somewhere.  Her granddaughter says she snores significantly and was on CPAP in the past but could not tolerate the mask.  I reviewed the patient's labs.  On December 16 her CMP was essentially normal, her CBC was normal.  On April 4 her TSH was normal.  The patient was seen by neurology sleep on January 9, 2014.  She had a head CT done in 2017 for headache and hypertension that was reported as normal.    Years ago, had difficulty falling and staying asleep.  Sleep is restless.  Saw a sleep specialist in the past, dx with SAAD and placed on CPAP.  Hasn't used in years.  Has gained 20 lbs since last sleep study.  Goes to bed around midnight to 1 am.  Is a little sleepy at that time.  Takes 30-60 minutes to fall asleep.  Gets up around 10 am.  Wakes up 2-3 times per night.  May have nocturia.  Has a hard time falling back to sleep.  Is tired all of the time.  When she can't fall asleep, will watch TV or get on her phone.  Not refreshed.  Accidentally naps for maybe a hour.  This happens several times per week.  Has hot flashes.  Never tried anything for sleep.  There is a tV in the bedroom.  Its on all night.  Has a nightlight.  No RLS symptoms.  No RBD symptoms.  No sleep walking.      [place] sleepiness scale:   1. Sitting and reading? 3  2. Watching TV? 3  3. Sitting inactive in a public place? 3  4. As a passenger in a car for an hour without a break? 3  5. Lying down to rest in the afternoon when able? 2  6. Sitting and talking to  someone? 1  7. Sitting quietly after lunch without EtOH? 2  8. In a car while stopped for a few minutes in traffic? 3  Total:    Daytime habits:  Caffeine- not much  Exercise- In the morning  EtOH- no  Activities 1 hour prior to bedtime- snack, watch TV     Apnea:  Gasping- no  Witnessed pauses- no  Night sweats- yes  AM HA- a couple of days ago did  Snoring- yes  FHx- cousin and an aunt  Dry mouth- sometimes    ROS:  Coughing- has allergies  Pain- no  GERD- yes  Nasal congestion/stuffiness- 2/week  Nocturia- yes      The following portions of the patient's history were reviewed and updated as appropriate: allergies, current medications, past family history, past medical history, past social history, past surgical history and problem list.    Review of Systems   Constitutional: Negative for activity change and appetite change.   HENT: Negative for ear pain, sore throat and trouble swallowing.    Eyes: Negative for photophobia, pain and redness.   Respiratory: Positive for apnea. Negative for cough and shortness of breath.    Cardiovascular: Negative for chest pain, palpitations and leg swelling.   Gastrointestinal: Negative for abdominal pain, nausea and vomiting.   Endocrine: Negative for cold intolerance, heat intolerance and polyphagia.   Musculoskeletal: Negative for back pain, gait problem and neck pain.   Skin: Negative for color change, pallor and rash.   Allergic/Immunologic: Negative for environmental allergies, food allergies and immunocompromised state.   Neurological: Negative for dizziness, tremors, seizures, syncope, facial asymmetry, speech difficulty, weakness, light-headedness, numbness and headaches.   Hematological: Negative for adenopathy. Does not bruise/bleed easily.   Psychiatric/Behavioral: Negative for agitation, behavioral problems, confusion, decreased concentration, dysphoric mood, hallucinations, self-injury, sleep disturbance and suicidal ideas. The patient is not nervous/anxious and is  not hyperactive.     I reviewed the ROS documented by the MA.  All other systems negative.      Objective:    Neurologic Exam    Physical Exam   Constitutional:  Vital signs reviewed.  No apparent distress.  Well groomed.  Eyes:  No injection, no icterus.    Ears, Nose, Mouth, Throat:  Mallampati grade IV, macroglossia, retrognathia, overjet, neck circumference is 37 cm  Respiratory:  Normal effort.  Clear to auscultation bilaterally.  Cardiovascular:  Regular rate and rhythm.  No murmurs.  No carotid bruits.   Musculoskeletal: Gait steady.  Muscle tone and bulk normal.  Strength is 5/5 in the bilateral upper and lower extremities proximally and distally.  Skin:  No rashes.  Warm, dry, and intact.  Psychiatric:  Good mood.  Normal affect.  Neurologic:  The patient is alert and oriented. Attention/concentration is within normal limits.  Speech is fluent without dysarthria.  The patient is able to follow complex commands without difficulty. Fund of knowledge normal.  Pupils equally round and reactive to light. Extraocular movements intact.  Facial sensation intact.  Smile symmetric. Palate elevates symmetrically.  SCM and trapezius are 5/5 bilaterally.  Tongue is midline.    Assessment/Plan:  The patient is a 65-year-old right-handed female with a history of asthma, hypertension, diabetes, sleep apnea, and hypothyroidism who presents to the neurology clinic today for the evaluation of sleep apnea.    1.  Sleep apnea-the patient was diagnosed with this years ago but failed CPAP.  I recommend a repeat sleep study.  She was asking about oral appliances however I think that we will probably need to try CPAP again.  If she fails it a second time, we may consider alternative therapies.    2.  Insomnia-it sounds like the patient has some delayed sleep phase.  I recommend avoiding screens the hour before bedtime.  She may also want to try some melatonin.  If this is not helpful, she may benefit from cognitive behavioral  therapy for insomnia and referral to Dr. Maldonado.     Problems Addressed this Visit     None      Visit Diagnoses     Obstructive sleep apnea    -  Primary    Relevant Orders    Home Sleep Study

## 2020-02-12 NOTE — PATIENT INSTRUCTIONS
• At night, only use the bed and bedroom for sleep and sex only. Do not read, watch TV, eat, or worry in bed.   • Lie down only when you are sleepy.   • If you are unable to fall asleep, get up and go to another room. Avoid stimulating activities if you do get up.   • No clocks (if you tend to look at the clock throughout the night) or TV (or other electronic screens) in the bedroom.   • Avoid screens (TV, computer, tablet, cell phone) the hour prior to bedtime and during your sleep period.   • Establish a regular bedtime routine and a regular sleep/wake schedule. Keep this schedule 7 days a week.   • Do not eat or drink close to bedtime.   • Make sure your bedroom is dark, cool, and comfortable.   • If you need background noise, use a fan or a white noise machine.   • Avoid caffeine (regular coffee, decaf coffee, tea, sodas, chocolate, energy drinks).   • Avoid alcohol and nicotine close to bedtime.   • Exercise during the day, but not within 3 hours of bedtime.   • Avoid naps.   • Check if any of your night time medications may cause sleep problems.   • If you have heart burn or indigestion at night: avoid eating close to bedtime; elevated your head of bed; avoid spicy foods, tomatoes, citrus, alcohol, caffeine, and mint at night; take your antacid at night; sleep on your left side.   • If you have nasal stuffiness or congestion: consider taking an over the counter allergy medicine such as zyrtec, claritin, or allegra at night as well as a nasal spray such as Flonase or Nasacort.   • If you gasp for air at night, stop breathing in your sleep, have night sweats, snore, unrefreshing sleep, feel tired during the day, have morning headaches or dry mouth in the morning, you may be at risk for having problems with your breathing at night, likely sleep apnea. You may want to talk to your doctor about these symptoms.   • Consider trying melatonin at night. This can be purchased over the counter without a prescription.  I  recommend doses between 0.5-3 mg.  Try [employer], [employer], [employer] (on Amazon) or REM Fresh brands.    • Consider the [employer]  edwige or [employer]I  for your smart device.

## 2020-02-17 RX ORDER — LEVOTHYROXINE SODIUM 137 UG/1
TABLET ORAL
Qty: 90 TABLET | Refills: 1 | Status: SHIPPED | OUTPATIENT
Start: 2020-02-17 | End: 2020-06-19 | Stop reason: SDUPTHER

## 2020-02-18 ENCOUNTER — HOSPITAL ENCOUNTER (OUTPATIENT)
Dept: DIABETES SERVICES | Facility: HOSPITAL | Age: 65
Setting detail: RECURRING SERIES
Discharge: HOME | End: 2020-02-18

## 2020-02-18 PROCEDURE — G0109 DIAB MANAGE TRN IND/GROUP: HCPCS

## 2020-02-19 RX ORDER — METFORMIN HYDROCHLORIDE 750 MG/1
TABLET, EXTENDED RELEASE ORAL
Qty: 90 TABLET | Refills: 0 | Status: SHIPPED | OUTPATIENT
Start: 2020-02-19 | End: 2020-06-08

## 2020-03-12 ENCOUNTER — TELEPHONE (OUTPATIENT)
Dept: NEUROLOGY | Facility: CLINIC | Age: 65
End: 2020-03-12

## 2020-03-12 NOTE — TELEPHONE ENCOUNTER
PT CALLED TODAY REGARDING THE SLEEP STUDY THAT SHE WAS REFERRED TO BY DR. MATA. SHE SAID LAST TIME SHE CALLED, SOMEONE TOLD HER THAT IT WAS PENDING INSURANCE AUTHORIZATION AND I TOLD HER IT IS STILL IN THAT STATUS. SHE WANTS TO KNOW WHAT IS TAKING SO LONG AND WOULD LIKE A CALL BACK SO SHE KNOWS WHAT'S GOING ON.    PT'S BEST CALL BACK - [phone]

## 2020-03-12 NOTE — TELEPHONE ENCOUNTER
I called the sleep lab and got the patient scheduled for April 2, 2020 at 10:00. I informed the patient of the appointment.

## 2020-04-02 ENCOUNTER — APPOINTMENT (OUTPATIENT)
Dept: SLEEP MEDICINE | Facility: HOSPITAL | Age: 65
End: 2020-04-02

## 2020-06-06 DIAGNOSIS — E78.2 MIXED HYPERLIPIDEMIA: Primary | ICD-10-CM

## 2020-06-06 DIAGNOSIS — E11.9 CONTROLLED TYPE 2 DIABETES MELLITUS WITHOUT COMPLICATION, WITHOUT LONG-TERM CURRENT USE OF INSULIN (HCC): ICD-10-CM

## 2020-06-06 DIAGNOSIS — E03.8 ADULT ONSET HYPOTHYROIDISM: ICD-10-CM

## 2020-06-06 DIAGNOSIS — I10 BENIGN ESSENTIAL HYPERTENSION: ICD-10-CM

## 2020-06-08 RX ORDER — METFORMIN HYDROCHLORIDE 750 MG/1
TABLET, EXTENDED RELEASE ORAL
Qty: 90 TABLET | Refills: 0 | Status: SHIPPED | OUTPATIENT
Start: 2020-06-08 | End: 2020-09-28

## 2020-06-09 ENCOUNTER — RESULTS ENCOUNTER (OUTPATIENT)
Dept: FAMILY MEDICINE CLINIC | Facility: CLINIC | Age: 65
End: 2020-06-09

## 2020-06-09 DIAGNOSIS — E11.9 CONTROLLED TYPE 2 DIABETES MELLITUS WITHOUT COMPLICATION, WITHOUT LONG-TERM CURRENT USE OF INSULIN (HCC): ICD-10-CM

## 2020-06-09 DIAGNOSIS — E78.2 MIXED HYPERLIPIDEMIA: ICD-10-CM

## 2020-06-13 ENCOUNTER — RESULTS ENCOUNTER (OUTPATIENT)
Dept: FAMILY MEDICINE CLINIC | Facility: CLINIC | Age: 65
End: 2020-06-13

## 2020-06-13 DIAGNOSIS — E03.8 ADULT ONSET HYPOTHYROIDISM: ICD-10-CM

## 2020-06-13 DIAGNOSIS — I10 BENIGN ESSENTIAL HYPERTENSION: ICD-10-CM

## 2020-06-13 DIAGNOSIS — E78.2 MIXED HYPERLIPIDEMIA: ICD-10-CM

## 2020-06-13 DIAGNOSIS — E11.9 CONTROLLED TYPE 2 DIABETES MELLITUS WITHOUT COMPLICATION, WITHOUT LONG-TERM CURRENT USE OF INSULIN (HCC): ICD-10-CM

## 2020-06-13 LAB
ALBUMIN SERPL-MCNC: 4.5 G/DL (ref 3.5–5.2)
ALBUMIN/GLOB SERPL: 1.7 G/DL
ALP SERPL-CCNC: 114 U/L (ref 39–117)
ALT SERPL-CCNC: 33 U/L (ref 1–33)
AST SERPL-CCNC: 23 U/L (ref 1–32)
BILIRUB SERPL-MCNC: 0.4 MG/DL (ref 0.2–1.2)
BUN SERPL-MCNC: 13 MG/DL (ref 8–23)
BUN/CREAT SERPL: 18.8 (ref 7–25)
CALCIUM SERPL-MCNC: 9.9 MG/DL (ref 8.6–10.5)
CHLORIDE SERPL-SCNC: 106 MMOL/L (ref 98–107)
CHOLEST SERPL-MCNC: 192 MG/DL (ref 0–200)
CHOLEST/HDLC SERPL: 3.2 {RATIO}
CO2 SERPL-SCNC: 27.6 MMOL/L (ref 22–29)
CREAT SERPL-MCNC: 0.69 MG/DL (ref 0.57–1)
GLOBULIN SER CALC-MCNC: 2.7 GM/DL
GLUCOSE SERPL-MCNC: 110 MG/DL (ref 65–99)
HBA1C MFR BLD: 6.1 % (ref 4.8–5.6)
HDLC SERPL-MCNC: 60 MG/DL (ref 40–60)
LDLC SERPL CALC-MCNC: 109 MG/DL (ref 0–100)
POTASSIUM SERPL-SCNC: 3.9 MMOL/L (ref 3.5–5.2)
PROT SERPL-MCNC: 7.2 G/DL (ref 6–8.5)
SODIUM SERPL-SCNC: 143 MMOL/L (ref 136–145)
TRIGL SERPL-MCNC: 117 MG/DL (ref 0–150)
TSH SERPL DL<=0.005 MIU/L-ACNC: 0.42 UIU/ML (ref 0.27–4.2)
VLDLC SERPL CALC-MCNC: 23.4 MG/DL

## 2020-06-19 ENCOUNTER — TELEMEDICINE (OUTPATIENT)
Dept: FAMILY MEDICINE CLINIC | Facility: CLINIC | Age: 65
End: 2020-06-19

## 2020-06-19 DIAGNOSIS — E11.9 CONTROLLED TYPE 2 DIABETES MELLITUS WITHOUT COMPLICATION, WITHOUT LONG-TERM CURRENT USE OF INSULIN (HCC): Primary | ICD-10-CM

## 2020-06-19 DIAGNOSIS — E78.2 MIXED HYPERLIPIDEMIA: ICD-10-CM

## 2020-06-19 DIAGNOSIS — I10 BENIGN ESSENTIAL HYPERTENSION: ICD-10-CM

## 2020-06-19 DIAGNOSIS — E03.8 ADULT ONSET HYPOTHYROIDISM: ICD-10-CM

## 2020-06-19 PROCEDURE — 99442 PR PHYS/QHP TELEPHONE EVALUATION 11-20 MIN: CPT | Performed by: FAMILY MEDICINE

## 2020-06-19 RX ORDER — AMLODIPINE BESYLATE 10 MG/1
10 TABLET ORAL DAILY
Qty: 90 TABLET | Refills: 1 | Status: SHIPPED | OUTPATIENT
Start: 2020-06-19 | End: 2021-01-18 | Stop reason: SDUPTHER

## 2020-06-19 RX ORDER — METOPROLOL SUCCINATE 100 MG/1
100 TABLET, EXTENDED RELEASE ORAL DAILY
Qty: 90 TABLET | Refills: 1 | Status: SHIPPED | OUTPATIENT
Start: 2020-06-19 | End: 2021-01-18 | Stop reason: SDUPTHER

## 2020-06-19 RX ORDER — IRBESARTAN AND HYDROCHLOROTHIAZIDE 300; 12.5 MG/1; MG/1
1 TABLET, FILM COATED ORAL DAILY
Qty: 90 TABLET | Refills: 1 | Status: SHIPPED | OUTPATIENT
Start: 2020-06-19 | End: 2021-01-18 | Stop reason: SDUPTHER

## 2020-06-19 RX ORDER — LEVOTHYROXINE SODIUM 137 UG/1
137 TABLET ORAL DAILY
Qty: 90 TABLET | Refills: 1 | Status: SHIPPED | OUTPATIENT
Start: 2020-06-19 | End: 2021-01-18 | Stop reason: SDUPTHER

## 2020-06-19 NOTE — PROGRESS NOTES
Unable to complete visit using a video connection to the patient. A phone visit was used to complete this visits. Total time of discussion was 11 minutes.   ASSESSMENT AND PLAN     Problem List Items Addressed This Visit        Cardiovascular and Mediastinum    Benign essential hypertension    Overview     [facility] 12/20/2019 Last BP in February well controlled.           Relevant Medications    amLODIPine (NORVASC) 10 MG tablet    irbesartan-hydrochlorothiazide (AVALIDE) 300-12.5 MG tablet    metoprolol succinate XL (TOPROL-XL) 100 MG 24 hr tablet    Mixed hyperlipidemia    Overview     [facility] 6/19/2020  Patient adamantly refuses to take a statin. She understands that this increases her risk of stroke and heart disease. She does have a good ratio.   Lab Results   Component Value Date/Time     (H) 06/12/2020 10:23 AM   HDL 60              Endocrine    Adult onset hypothyroidism    Overview     [facility] 6/19/2020   Thyroid is good on blood work from a couple days ago.          Relevant Medications    metoprolol succinate XL (TOPROL-XL) 100 MG 24 hr tablet    levothyroxine (SYNTHROID, LEVOTHROID) 137 MCG tablet    Diabetes mellitus type 2, controlled (CMS/[facility]) - Primary    Overview     [facility] 6/19/2020  Labs are better than she expected. Doing OK             Return in about 6 months (around 12/19/2020).  Patient was given instructions and counseling regarding her condition or for health maintenance advice. Please see specific information pulled into the AVS if appropriate.        SUBJECTIVE   Micheline Cervantes is a 65 y.o. female being seen in our office today for Hypertension and Diabetes               Social History  She  reports that she has never smoked. She has never used smokeless tobacco. She reports that she does not drink alcohol or use drugs.    History of the Present Illness   HPI Taking medications as ordered. No complaints today.  Review of Systems   Constitutional: Negative for fatigue, fever  and unexpected weight change.   Respiratory: Negative for cough and shortness of breath.    Cardiovascular: Negative for chest pain.   Psychiatric/Behavioral: Negative for dysphoric mood. The patient is not nervous/anxious.    I have reviewed the ROS as documented by the MA. Mariela Blum MD      OBJECTIVE  Vital Signs          Discussed labs

## 2020-09-28 DIAGNOSIS — E11.9 CONTROLLED TYPE 2 DIABETES MELLITUS WITHOUT COMPLICATION, WITHOUT LONG-TERM CURRENT USE OF INSULIN (HCC): ICD-10-CM

## 2020-09-28 RX ORDER — METFORMIN HYDROCHLORIDE 750 MG/1
TABLET, EXTENDED RELEASE ORAL
Qty: 90 TABLET | Refills: 0 | Status: SHIPPED | OUTPATIENT
Start: 2020-09-28 | End: 2021-01-07

## 2021-01-07 DIAGNOSIS — I10 BENIGN ESSENTIAL HYPERTENSION: ICD-10-CM

## 2021-01-07 DIAGNOSIS — E11.9 CONTROLLED TYPE 2 DIABETES MELLITUS WITHOUT COMPLICATION, WITHOUT LONG-TERM CURRENT USE OF INSULIN (HCC): Primary | ICD-10-CM

## 2021-01-07 DIAGNOSIS — E03.8 ADULT ONSET HYPOTHYROIDISM: ICD-10-CM

## 2021-01-07 DIAGNOSIS — R53.83 MALAISE AND FATIGUE: ICD-10-CM

## 2021-01-07 DIAGNOSIS — R53.81 MALAISE AND FATIGUE: ICD-10-CM

## 2021-01-07 DIAGNOSIS — E78.2 MIXED HYPERLIPIDEMIA: ICD-10-CM

## 2021-01-07 DIAGNOSIS — E11.9 CONTROLLED TYPE 2 DIABETES MELLITUS WITHOUT COMPLICATION, WITHOUT LONG-TERM CURRENT USE OF INSULIN (HCC): ICD-10-CM

## 2021-01-07 RX ORDER — METFORMIN HYDROCHLORIDE 750 MG/1
TABLET, EXTENDED RELEASE ORAL
Qty: 90 TABLET | Refills: 0 | Status: SHIPPED | OUTPATIENT
Start: 2021-01-07 | End: 2021-04-06

## 2021-01-18 ENCOUNTER — TELEMEDICINE (OUTPATIENT)
Dept: FAMILY MEDICINE CLINIC | Facility: CLINIC | Age: 66
End: 2021-01-18

## 2021-01-18 VITALS — HEIGHT: 64 IN | WEIGHT: 170 LBS | BODY MASS INDEX: 29.02 KG/M2

## 2021-01-18 DIAGNOSIS — E03.8 ADULT ONSET HYPOTHYROIDISM: ICD-10-CM

## 2021-01-18 DIAGNOSIS — E11.9 CONTROLLED TYPE 2 DIABETES MELLITUS WITHOUT COMPLICATION, WITHOUT LONG-TERM CURRENT USE OF INSULIN (HCC): ICD-10-CM

## 2021-01-18 DIAGNOSIS — N63.10 BREAST MASS, RIGHT: ICD-10-CM

## 2021-01-18 DIAGNOSIS — E78.2 MIXED HYPERLIPIDEMIA: ICD-10-CM

## 2021-01-18 DIAGNOSIS — R10.2 PELVIC PAIN: ICD-10-CM

## 2021-01-18 DIAGNOSIS — I10 BENIGN ESSENTIAL HYPERTENSION: Primary | ICD-10-CM

## 2021-01-18 PROCEDURE — 99214 OFFICE O/P EST MOD 30 MIN: CPT | Performed by: FAMILY MEDICINE

## 2021-01-18 RX ORDER — IRBESARTAN AND HYDROCHLOROTHIAZIDE 300; 12.5 MG/1; MG/1
1 TABLET, FILM COATED ORAL DAILY
Qty: 90 TABLET | Refills: 1 | Status: SHIPPED | OUTPATIENT
Start: 2021-01-18 | End: 2021-09-17

## 2021-01-18 RX ORDER — LEVOTHYROXINE SODIUM 137 UG/1
137 TABLET ORAL DAILY
Qty: 90 TABLET | Refills: 3 | Status: SHIPPED | OUTPATIENT
Start: 2021-01-18 | End: 2022-01-26

## 2021-01-18 RX ORDER — AMLODIPINE BESYLATE 10 MG/1
10 TABLET ORAL DAILY
Qty: 90 TABLET | Refills: 1 | Status: SHIPPED | OUTPATIENT
Start: 2021-01-18 | End: 2021-09-17

## 2021-01-18 RX ORDER — METOPROLOL SUCCINATE 100 MG/1
100 TABLET, EXTENDED RELEASE ORAL DAILY
Qty: 90 TABLET | Refills: 1 | Status: SHIPPED | OUTPATIENT
Start: 2021-01-18 | End: 2021-09-17

## 2021-01-18 NOTE — PROGRESS NOTES
Assessment and Plan:     Problem List Items Addressed This Visit     Adult onset hypothyroidism    Overview     [facility] 1/18/2021   Thyroid is good on blood work from a couple days ago.          Relevant Medications    levothyroxine (SYNTHROID, LEVOTHROID) 137 MCG tablet    metoprolol succinate XL (TOPROL-XL) 100 MG 24 hr tablet    Benign essential hypertension - Primary    Overview     [facility] 1/18/2021 Needs BP check. Will be coming in for pelvic and breast check.          Relevant Medications    irbesartan-hydrochlorothiazide (AVALIDE) 300-12.5 MG tablet    metoprolol succinate XL (TOPROL-XL) 100 MG 24 hr tablet    amLODIPine (NORVASC) 10 MG tablet    Diabetes mellitus type 2, controlled (CMS/[facility])    Overview     [facility] 1/18/2021  Labs are good         Mixed hyperlipidemia    Overview     [facility] 1/18/2021  Patient adamantly refuses to take a statin. She understands that this increases her risk of stroke and heart disease. She does have a good ratio.   Lab Results   Component Value Date/Time     (H) 01/13/2021 09:42 AM   HDL 60               Patient was given instructions and counseling regarding her condition or for health maintenance advice. Please see specific information pulled into the AVS if appropriate.        Micheline Cervantes is a 65 y.o. female being seen today for Hypertension, Diabetes, and Hyperlipidemia   Subjective   History of the Present Illness   Patient's labs are pretty stable. Cholesterol has gone up and this is a risk for her. She is aware of that. She has missed a few doses on the thyroid.    Not constant but a pain in her vagina that comes and goes since this summer. Not re to eliminating. Lasts a couple of hours. Not changing.   Social History  She  reports that she has never smoked. She has never used smokeless tobacco. She reports that she does not drink alcohol or use drugs.  Objective   Vital Signs          BP Readings from Last 1 Encounters:   08/29/20 144/84     Wt  Readings from Last 3 Encounters:   01/18/21 77.1 kg (170 lb)   08/29/20 77.1 kg (170 lb)   02/12/20 83.9 kg (185 lb)   Body mass index is 29.18 kg/m².     Physical Exam  Vitals signs reviewed.   Constitutional:       Appearance: Normal appearance. She is well-developed.   Neurological:      Mental Status: She is alert.   Psychiatric:         Behavior: Behavior normal.         Thought Content: Thought content normal.         Judgment: Judgment normal.     Patient chose to receive care through a telehealth visit.  She consents to use a video/audio connection for her medical care today.

## 2021-01-25 ENCOUNTER — OFFICE VISIT (OUTPATIENT)
Dept: FAMILY MEDICINE CLINIC | Facility: CLINIC | Age: 66
End: 2021-01-25

## 2021-01-25 VITALS
SYSTOLIC BLOOD PRESSURE: 160 MMHG | RESPIRATION RATE: 14 BRPM | OXYGEN SATURATION: 99 % | DIASTOLIC BLOOD PRESSURE: 90 MMHG | HEIGHT: 64 IN | BODY MASS INDEX: 31.07 KG/M2 | TEMPERATURE: 96.9 F | HEART RATE: 89 BPM | WEIGHT: 182 LBS

## 2021-01-25 DIAGNOSIS — N64.59 BREAST THICKENING: ICD-10-CM

## 2021-01-25 DIAGNOSIS — M21.619 BUNION: ICD-10-CM

## 2021-01-25 DIAGNOSIS — I10 BENIGN ESSENTIAL HYPERTENSION: ICD-10-CM

## 2021-01-25 DIAGNOSIS — Z12.39 ENCOUNTER FOR SCREENING BREAST EXAMINATION AND DISCUSSION OF BREAST SELF EXAMINATION: Primary | ICD-10-CM

## 2021-01-25 PROCEDURE — G0008 ADMIN INFLUENZA VIRUS VAC: HCPCS | Performed by: FAMILY MEDICINE

## 2021-01-25 PROCEDURE — 99214 OFFICE O/P EST MOD 30 MIN: CPT | Performed by: FAMILY MEDICINE

## 2021-01-25 PROCEDURE — 90732 PPSV23 VACC 2 YRS+ SUBQ/IM: CPT | Performed by: FAMILY MEDICINE

## 2021-01-25 PROCEDURE — G0009 ADMIN PNEUMOCOCCAL VACCINE: HCPCS | Performed by: FAMILY MEDICINE

## 2021-01-25 PROCEDURE — 90694 VACC AIIV4 NO PRSRV 0.5ML IM: CPT | Performed by: FAMILY MEDICINE

## 2021-01-25 NOTE — PROGRESS NOTES
Assessment and Plan:     Problem List Items Addressed This Visit     Benign essential hypertension    Overview     [facility] 1/25/2021 BP elevated here today. Taking her medications. Had trouble getting here with the rain.            Other Visit Diagnoses     Encounter for screening breast examination and discussion of breast self examination    -  Primary    Bunion        Relevant Orders    Ambulatory Referral to Orthopedic Surgery    Breast thickening        Relevant Orders    Mammo diagnostic right w CAD    US breast right complete        Patient was given instructions and counseling regarding her condition or for health maintenance advice. Please see specific information pulled into the AVS if appropriate.        Micheline Cervantes is a 65 y.o. female being seen today for Gynecologic Exam (breast exam)   Subjective   History of the Present Illness   Right breast feels thicker than the left. Not yet due for her mammogram    Having pain in the vagina on the right side. Sometimes goes up to her back. Has been going on for months but worse lately. Unrelated to elimination.     Also has bunions she thinks need to be corrected.  Social History  She  reports that she has never smoked. She has never used smokeless tobacco. She reports that she does not drink alcohol or use drugs.  Objective   Vital Signs          BP Readings from Last 1 Encounters:   01/25/21 160/90     Wt Readings from Last 3 Encounters:   01/25/21 82.6 kg (182 lb)   01/18/21 77.1 kg (170 lb)   08/29/20 77.1 kg (170 lb)   Body mass index is 31.24 kg/m².     Physical Exam  Vitals signs reviewed.   Constitutional:       Appearance: Normal appearance. She is well-developed.   Chest:       Genitourinary:     Uterus: Absent.       Adnexa: Right adnexa normal and left adnexa normal.      Rectum: No mass or tenderness.   Neurological:      Mental Status: She is alert.   Psychiatric:         Behavior: Behavior normal.         Thought Content: Thought content  normal.         Judgment: Judgment normal.

## 2021-03-16 ENCOUNTER — BULK ORDERING (OUTPATIENT)
Dept: CASE MANAGEMENT | Facility: OTHER | Age: 66
End: 2021-03-16

## 2021-03-16 DIAGNOSIS — Z23 IMMUNIZATION DUE: ICD-10-CM

## 2021-03-30 ENCOUNTER — TELEPHONE (OUTPATIENT)
Dept: FAMILY MEDICINE CLINIC | Facility: CLINIC | Age: 66
End: 2021-03-30

## 2021-03-30 NOTE — TELEPHONE ENCOUNTER
PT IS REQUESTING A NEW REFERRAL TO A DERMATOLOGIST. THE ON SHE CURRENTLY GOES TO NO LONGER TAKES HER INSURANCE. CAN LEAVE A VM.

## 2021-04-06 DIAGNOSIS — E11.9 CONTROLLED TYPE 2 DIABETES MELLITUS WITHOUT COMPLICATION, WITHOUT LONG-TERM CURRENT USE OF INSULIN (HCC): ICD-10-CM

## 2021-04-06 RX ORDER — METFORMIN HYDROCHLORIDE 750 MG/1
TABLET, EXTENDED RELEASE ORAL
Qty: 90 TABLET | Refills: 0 | Status: SHIPPED | OUTPATIENT
Start: 2021-04-06 | End: 2021-07-06

## 2021-06-15 ENCOUNTER — PATIENT OUTREACH (OUTPATIENT)
Dept: PHARMACY | Facility: HOSPITAL | Age: 66
End: 2021-06-15

## 2021-06-15 NOTE — OUTREACH NOTE
Medication Adherence Call    Patient was called today to discuss medication adherence with irbesartan/HCTZ, as the patient was identified as having care opportunities.     The patient did not answer. I will try again at a later date.    Claudia Martinez PharmD  Population Health Pharmacist  06/15/21    Medication Adherence Call    Patient was called again today to discuss medication adherence with irbesartan/HCTZ, as the patient was identified as having care opportunities.     The patient did not answer for the second time. I will try once more at a later date.    Claudia Martinez PharmD  Population [facility] Pharmacist  06/16/21    Medication Adherence Call    Patient was called today to discuss medication adherence with irbesartan/HCTZ, as the patient was identified as having care opportunities.     The patient denies issues with adherence and denies any barriers to receiving medications. Patient states she recently got a refill and is taking as directed.    Claudia Martinez PharmD  [facility] Pharmacist  06/17/21

## 2021-06-17 ENCOUNTER — OFFICE VISIT (OUTPATIENT)
Dept: ORTHOPEDIC SURGERY | Facility: CLINIC | Age: 66
End: 2021-06-17

## 2021-06-17 VITALS — BODY MASS INDEX: 30.05 KG/M2 | HEIGHT: 64 IN | WEIGHT: 176 LBS | TEMPERATURE: 98.4 F

## 2021-06-17 DIAGNOSIS — M20.12 VALGUS DEFORMITY OF BOTH GREAT TOES: ICD-10-CM

## 2021-06-17 DIAGNOSIS — G89.29 OTHER CHRONIC PAIN: Primary | ICD-10-CM

## 2021-06-17 DIAGNOSIS — M72.2 PLANTAR FASCIITIS: ICD-10-CM

## 2021-06-17 DIAGNOSIS — M20.11 VALGUS DEFORMITY OF BOTH GREAT TOES: ICD-10-CM

## 2021-06-17 PROCEDURE — 99203 OFFICE O/P NEW LOW 30 MIN: CPT | Performed by: ORTHOPAEDIC SURGERY

## 2021-06-17 PROCEDURE — 73630 X-RAY EXAM OF FOOT: CPT | Performed by: ORTHOPAEDIC SURGERY

## 2021-06-17 NOTE — PROGRESS NOTES
New Patient Complaint      Patient: Micheline Cervantes  YOB: 1955 66 y.o. female  Medical Record Number: [id number]    Chief Complaints: My feet hurt    History of Present Illness:    Patient is here today for primary reason of bilateral heel pain with secondary reason of bilateral bunions.    She states that she has had several month history of moderate stabbing pain in the plantar aspect of her heels left more so than right especially with first step in the morning after arising from prolonged seated position.  She is not had any numbness or tingling.    She also reports mild intermittent aching pain around the first MTP joints bilaterally.    HPI    Allergies:   Allergies   Allergen Reactions   • Contrast Dye Nausea And Vomiting   • Statins Nausea Only       Medications:   Current Outpatient Medications on File Prior to Visit   Medication Sig   • amLODIPine (NORVASC) 10 MG tablet Take 1 tablet by mouth Daily.   • B Complex Vitamins (VITAMIN B COMPLEX) capsule capsule Take  by mouth Daily.   • Calcium Acetate, Phos Binder, (CALCIUM ACETATE PO) Take  by mouth.   • Cholecalciferol (VITAMIN D3) 2000 UNITS tablet Take 1 tablet by mouth Daily.   • COD LIVER OIL PO Take  by mouth.   • irbesartan-hydrochlorothiazide (AVALIDE) 300-12.5 MG tablet Take 1 tablet by mouth Daily.   • levothyroxine (SYNTHROID, LEVOTHROID) 137 MCG tablet Take 1 tablet by mouth Daily.   • Magnesium 250 MG tablet Take  by mouth.   • metFORMIN ER (GLUCOPHAGE-XR) 750 MG 24 hr tablet TAKE ONE TABLET BY MOUTH EVERY MORNING WITH BREAKFAST   • metoprolol succinate XL (TOPROL-XL) 100 MG 24 hr tablet Take 1 tablet by mouth Daily.   • Milk Thistle 250 MG capsule Take  by mouth Daily.   • Multiple Vitamins-Minerals (MULTIVITAMIN ADULT PO) Take 1 tablet by mouth Daily.     No current facility-administered medications on file prior to visit.       Past Medical History:   Diagnosis Date   • Abdominal pain    • Asthma    • Diabetes mellitus  "(CMS/HCC)    • GERD (gastroesophageal reflux disease)    • Hypertension    • Hypothyroidism    • Kidney stones    • Sleep apnea with use of continuous positive airway pressure (CPAP)      Past Surgical History:   Procedure Laterality Date   • COLONOSCOPY  approx 2016    repeat 5 years -per patient-normal   • ENDOSCOPY N/A 4/17/2018    Procedure: ESOPHAGOGASTRODUODENOSCOPY with cold biopsies;  Surgeon: Zev Ludwig MD;  Location: [facility] ENDOSCOPY;  Service: Gastroenterology   • HYSTERECTOMY      In the 1990's   • LUNG SURGERY      Had a blockage around 2002 -- 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)   • THYROIDECTOMY      in the 80s     Social History     Occupational History   • Not on file   Tobacco Use   • Smoking status: Never Smoker   • Smokeless tobacco: Never Used   Substance and Sexual Activity   • Alcohol use: No   • Drug use: No   • Sexual activity: Defer      Social History     Social History Narrative   • Not on file     Family History   Problem Relation Age of Onset   • Diabetes Father    • Hypertension Father    • Goiter Mother    • Colon polyps Mother    • Colon cancer Maternal Aunt    • Lung cancer Maternal Uncle    • Thyroid disease Maternal Grandmother    • Malig Hyperthermia Neg Hx        Review of Systems: 14 point review of systems performed, positive pertinent findings identified in HPI. All remaining systems negative except for sneezing and wheezing    Review of Systems      Physical Exam:   Vitals:    06/17/21 1446   Temp: 98.4 °F (36.9 °C)   Weight: 79.8 kg (176 lb)   Height: 162.6 cm (64\")   PainSc:   8     Physical Exam   Constitutional: pleasant, well developed She is in thin flimsy sandals today  Eyes: sclera non icteric  Hearing : adequate for exam  Cardiovascular: palpable pulses in bilaeral feet, bilateral calves/ thighs NT without sign of DVT  Respiratoy: breathing unlabored   Neurological: grossly sensate to LT throughout bilateral LEs  Psychiatric: oriented with " normal mood and affect.   Lymphatic: No palpable popliteal lymphadenopathy bilateral LEs  Skin: intact throughout bilateral legs/feet  Musculoskeletal: Exam she has mild to moderate discomfort at the insertion of the plantar fascia bilaterally left greater than right.  There was no focal pain with medial lateral calcaneal compression and negative Tinel's of the tibial nerve.  She had about 5 degrees of dorsiflexion neutral.    She had mild to moderate hallux valgus right greater than left that was passively correctable.  She had about 50 degrees dorsiflexion and 30 degrees plantarflexion bilaterally.  Physical Exam  Ortho Exam    Radiology: 3 views of both feet ordered evaluate pain reviewed and no prior x-rays available for comparison.  There is mild to moderate hallux valgus deformity with some arthritis of the first MTP joint symptoms are more significant on the right than the left.  Mild plantar calcaneal spurring on the left but really none on the right.    Assessment/Plan: 1.  Bilateral plantar fasciitis left greater than right  2.  Bilateral hallux valgus right greater than left.    We discussed treatment options and she does want to do anything with her bunions yet until we get her heels under better control..    Plantar fascitis etiology and  treatment protocol discussed with pt. Information sheet provided and reviewed. This with consist of a night splint that was provided and discussed etiology of use. Towel stretch prior to first step in morning. Avoid barefoot ambulation. Gastroc and soleus heel cord stretch 4-5 times throughout the day, for 2-1/2 minutes of each session.  instruction sheet provided and demonstrated for patient. Use of shoes with arch support and ice bottle massage for 15-20 minutes each night. Counseled that improvement may take several months. Additional treatment modalities may include injection, therapy, or immobilization. I do not have anything to offer regarding surgical treatment  at this time.  She was also fitted with gel heel pads pt will follow up in 8 weeks for further evaluation and determination of treatment course going forward..

## 2021-06-17 NOTE — PATIENT INSTRUCTIONS
[facility] - Orthopedics    Jerry Mccarty MD    PLANTAR FASCIITIS / HEEL PAIN    Plantar Fasciitis is a very common condition that affects people of all ages.  Frequent symptoms include heel pain that is worse upon arising in the morning or when arising from prolonged sitting.  Occasionally it feels as if there is a nail driven into your heel or there is burning pain about the heel.  These symptoms may be present for months prior to being seen in the doctor’s office.      Certain conditions may aggravate this heel pain.  These conditions include recent weight gain, a change in activity, such as increasing mileage with running, running on different surfaces, a change in exercise shoes, or worn out shoes.     The heel pain is caused by inflammation of the plantar fascia.  This fascia attaches to the heel and occasionally a bone spur is seen on x-ray.  This bone spur is only associated with the inflammation and is not the cause of the pain.         Treatment is directed at decreasing the inflammation and protecting the heel.  Specific treatments we may use are listed below.    1. Stretching the Achilles tendon, hamstrings, and plantar fascia  2. Ice applied for 20 minutes in the evening daily  3. Shoes with supportive heels and cushioned mid soles  4. Night splinting  5. Nonsteroidal anti-inflammatory medications  6. Heel padding or heel cups  7. Orthotic devices    Approximately 90% of patients will have resolution of their symptoms with this treatment.  Very rarely will an injection or surgery be necessary.  Plantar fasciitis is like bursitis of the shoulder and it may take up to one year to resolve.  Like bursitis, it may flair up again and require additional treatment.     After your initial visit, if you have not improved, we will see you in 6-8 weeks to reassess our therapy.     Do not be discouraged.  You will get better, but it may take time.

## 2021-07-06 DIAGNOSIS — E11.9 CONTROLLED TYPE 2 DIABETES MELLITUS WITHOUT COMPLICATION, WITHOUT LONG-TERM CURRENT USE OF INSULIN (HCC): ICD-10-CM

## 2021-07-06 RX ORDER — METFORMIN HYDROCHLORIDE 750 MG/1
TABLET, EXTENDED RELEASE ORAL
Qty: 30 TABLET | Refills: 0 | Status: SHIPPED | OUTPATIENT
Start: 2021-07-06 | End: 2021-08-19

## 2021-08-19 DIAGNOSIS — E11.9 CONTROLLED TYPE 2 DIABETES MELLITUS WITHOUT COMPLICATION, WITHOUT LONG-TERM CURRENT USE OF INSULIN (HCC): ICD-10-CM

## 2021-08-19 RX ORDER — METFORMIN HYDROCHLORIDE 750 MG/1
TABLET, EXTENDED RELEASE ORAL
Qty: 30 TABLET | Refills: 0 | Status: SHIPPED | OUTPATIENT
Start: 2021-08-19 | End: 2021-09-14 | Stop reason: SDUPTHER

## 2021-08-30 ENCOUNTER — OFFICE VISIT (OUTPATIENT)
Dept: FAMILY MEDICINE CLINIC | Facility: CLINIC | Age: 66
End: 2021-08-30

## 2021-08-30 VITALS
RESPIRATION RATE: 16 BRPM | SYSTOLIC BLOOD PRESSURE: 122 MMHG | HEIGHT: 64 IN | DIASTOLIC BLOOD PRESSURE: 80 MMHG | WEIGHT: 179 LBS | BODY MASS INDEX: 30.56 KG/M2 | OXYGEN SATURATION: 99 % | HEART RATE: 52 BPM

## 2021-08-30 DIAGNOSIS — E11.9 CONTROLLED TYPE 2 DIABETES MELLITUS WITHOUT COMPLICATION, WITHOUT LONG-TERM CURRENT USE OF INSULIN (HCC): ICD-10-CM

## 2021-08-30 DIAGNOSIS — Z00.00 MEDICARE ANNUAL WELLNESS VISIT, SUBSEQUENT: Primary | ICD-10-CM

## 2021-08-30 PROBLEM — G47.30 SLEEP APNEA: Status: ACTIVE | Noted: 2021-08-30

## 2021-08-30 PROCEDURE — G0439 PPPS, SUBSEQ VISIT: HCPCS | Performed by: FAMILY MEDICINE

## 2021-08-30 PROCEDURE — 1159F MED LIST DOCD IN RCRD: CPT | Performed by: FAMILY MEDICINE

## 2021-08-30 PROCEDURE — 96160 PT-FOCUSED HLTH RISK ASSMT: CPT | Performed by: FAMILY MEDICINE

## 2021-08-30 PROCEDURE — 1170F FXNL STATUS ASSESSED: CPT | Performed by: FAMILY MEDICINE

## 2021-08-30 NOTE — PROGRESS NOTES
QUICK REFERENCE INFORMATION:  The ABCs of the Annual Wellness Visit     Subjective   History of Present Illness  Micheline Cervantes is a 66 y.o. female who presents for a Subsequent Wellness Visit. She has questions re the COVID shot as she heard it has statins in it and she is allergic to statins.  HEALTH RISK ASSESSMENT    1955  Recent Hospitalizations:  No hospitalization(s) within the last year..  Current Medical Providers:  Patient Care Team:  Mariela Blum MD as PCP - General (Family Medicine)  Smoking Status:  Social History     Tobacco Use   Smoking Status Never Smoker   Smokeless Tobacco Never Used     Alcohol Consumption:  Social History     Substance and Sexual Activity   Alcohol Use No     Fall Risk Screen:  STEADI Fall Risk Assessment was completed, and patient is at LOW risk for falls.Assessment completed on:8/30/2021  Depression Screen:   PHQ-2/PHQ-9 Depression Screening 8/30/2021   Little interest or pleasure in doing things 0   Feeling down, depressed, or hopeless 0   Trouble falling or staying asleep, or sleeping too much 0   Feeling tired or having little energy 0   Poor appetite or overeating 0   Feeling bad about yourself - or that you are a failure or have let yourself or your family down 0   Trouble concentrating on things, such as reading the newspaper or watching television 0   Moving or speaking so slowly that other people could have noticed. Or the opposite - being so fidgety or restless that you have been moving around a lot more than usual 0   Thoughts that you would be better off dead, or of hurting yourself in some way 0   Total Score 0   If you checked off any problems, how difficult have these problems made it for you to do your work, take care of things at home, or get along with other people? Not difficult at all     Health Habits and Functional and Cognitive Screening:  Functional & Cognitive Status 8/30/2021   Do you have difficulty preparing food and eating? No   Do you have  difficulty bathing yourself, getting dressed or grooming yourself? No   Do you have difficulty using the toilet? No   Do you have difficulty moving around from place to place? No   Do you have trouble with steps or getting out of a bed or a chair? No   Current Diet Well Balanced Diet   Dental Exam Not up to date   Eye Exam Up to date   Exercise (times per week) 0 times per week   Current Exercises Include No Regular Exercise   Do you need help using the phone?  No   Are you deaf or do you have serious difficulty hearing?  No   Do you need help with transportation? No   Do you need help shopping? No   Do you need help preparing meals?  No   Do you need help with housework?  No   Do you need help with laundry? No   Do you need help taking your medications? No   Do you need help managing money? No   Do you ever drive or ride in a car without wearing a seat belt? No   Have you felt unusual stress, anger or loneliness in the last month? No   Who do you live with? Alone   If you need help, do you have trouble finding someone available to you? No   Have you been bothered in the last four weeks by sexual problems? No   Do you have difficulty concentrating, remembering or making decisions? No       Health Habits  Current Diet: Well Balanced Diet  Dental Exam: Not up to date  Eye Exam: Up to date  Exercise (times per week): 0 times per week  Current Exercises Include: No Regular Exercise  Does the patient have evidence of cognitive impairment? No   Aspirin use counseling: Does not need ASA (and currently is not on it)  Recent Lab Results:  Labs today  Age-appropriate Screening Schedule:  Refer to the list below for future screening recommendations based on patient's age, sex and/or medical conditions. Orders for these recommended tests are listed in the plan section. The patient has been provided with a written plan.  Health Maintenance   Topic Date Due   • DXA SCAN  Never done   • ZOSTER VACCINE (2 of 3) 07/03/2017   •  "HEMOGLOBIN A1C  07/13/2021   • INFLUENZA VACCINE  10/01/2021   • LIPID PANEL  01/13/2022   • URINE MICROALBUMIN  01/13/2022   • DIABETIC EYE EXAM  05/18/2022   • DIABETIC FOOT EXAM  08/30/2022   • MAMMOGRAM  06/19/2023   • TDAP/TD VACCINES (2 - Td or Tdap) 08/20/2028   • PAP SMEAR  Discontinued      Advanced Care Planning: Not discussed today.   Identification of Risk Factors:  Risk factors include: weight     Compared to one year ago, the patient feels her physical health is the same and her mental health is the same.  Objective     Vitals:    08/30/21 1333   BP: 122/80   Pulse: 52   Resp: 16   SpO2: 99%   Weight: 81.2 kg (179 lb)   Height: 162.6 cm (64\")     Physical Exam  Patient's Body mass index is 30.73 kg/m². is above normal parameters. Recommendations include: exercise counseling.     Assessment/Plan   Patient Self-Management and Personalized Health Advice      The patient has been provided with information about:  Immunizations Discussed/Encouraged (specific immunizations; COVID19 )  Visit Diagnoses:  Problem List Items Addressed This Visit     Diabetes mellitus type 2, controlled (CMS/[facility])    [facility]     [facility]  Labs are good         Relevant Orders    Hemoglobin A1c      Other Visit Diagnoses     Medicare annual wellness visit, subsequent    -  Primary        Orders Placed This Encounter   Procedures   • Hemoglobin A1c      Current medication list contains high risk medications. No harmful drug interactions have been identified.  Plan of action: Continued monitoring  Follow Up:  No follow-ups on file.   An After Visit Summary and PPPS with all of these plans were given to the patient.                                        "

## 2021-08-31 LAB — HBA1C MFR BLD: 6.5 % (ref 4.8–5.6)

## 2021-09-14 DIAGNOSIS — E11.9 CONTROLLED TYPE 2 DIABETES MELLITUS WITHOUT COMPLICATION, WITHOUT LONG-TERM CURRENT USE OF INSULIN (HCC): ICD-10-CM

## 2021-09-14 RX ORDER — METFORMIN HYDROCHLORIDE 750 MG/1
750 TABLET, EXTENDED RELEASE ORAL
Qty: 90 TABLET | Refills: 1 | Status: SHIPPED | OUTPATIENT
Start: 2021-09-14 | End: 2022-04-26

## 2021-09-16 DIAGNOSIS — I10 BENIGN ESSENTIAL HYPERTENSION: ICD-10-CM

## 2021-09-17 RX ORDER — METOPROLOL SUCCINATE 100 MG/1
TABLET, EXTENDED RELEASE ORAL
Qty: 90 TABLET | Refills: 1 | Status: SHIPPED | OUTPATIENT
Start: 2021-09-17 | End: 2022-01-06

## 2021-09-17 RX ORDER — AMLODIPINE BESYLATE 10 MG/1
TABLET ORAL
Qty: 90 TABLET | Refills: 1 | Status: SHIPPED | OUTPATIENT
Start: 2021-09-17 | End: 2022-03-28 | Stop reason: SDUPTHER

## 2021-09-17 RX ORDER — IRBESARTAN AND HYDROCHLOROTHIAZIDE 300; 12.5 MG/1; MG/1
TABLET, FILM COATED ORAL
Qty: 90 TABLET | Refills: 1 | Status: SHIPPED | OUTPATIENT
Start: 2021-09-17 | End: 2022-03-28 | Stop reason: SDUPTHER

## 2021-11-10 ENCOUNTER — TELEPHONE (OUTPATIENT)
Dept: FAMILY MEDICINE CLINIC | Facility: CLINIC | Age: 66
End: 2021-11-10

## 2021-11-11 DIAGNOSIS — Z83.71 FAMILY HISTORY OF COLONIC POLYPS: Primary | ICD-10-CM

## 2021-12-21 ENCOUNTER — PRE-PROCEDURE SCREENING (OUTPATIENT)
Dept: GASTROENTEROLOGY | Facility: CLINIC | Age: 66
End: 2021-12-21

## 2021-12-21 NOTE — TELEPHONE ENCOUNTER
Colonoscopy screening has been sent to  for review                   Pt verbalized and understood that it would be few weeks before been schedule

## 2022-01-06 ENCOUNTER — PREP FOR SURGERY (OUTPATIENT)
Dept: OTHER | Facility: HOSPITAL | Age: 67
End: 2022-01-06

## 2022-01-06 DIAGNOSIS — Z83.71 FAMILY HISTORY OF COLONIC POLYPS: Primary | ICD-10-CM

## 2022-01-06 DIAGNOSIS — I10 BENIGN ESSENTIAL HYPERTENSION: ICD-10-CM

## 2022-01-06 DIAGNOSIS — K63.5 COLON POLYP: ICD-10-CM

## 2022-01-06 RX ORDER — METOPROLOL SUCCINATE 100 MG/1
TABLET, EXTENDED RELEASE ORAL
Qty: 90 TABLET | Refills: 0 | Status: SHIPPED | OUTPATIENT
Start: 2022-01-06 | End: 2022-03-28 | Stop reason: SDUPTHER

## 2022-01-24 DIAGNOSIS — E03.8 ADULT ONSET HYPOTHYROIDISM: ICD-10-CM

## 2022-01-26 RX ORDER — LEVOTHYROXINE SODIUM 137 UG/1
TABLET ORAL
Qty: 30 TABLET | Refills: 0 | Status: SHIPPED | OUTPATIENT
Start: 2022-01-26 | End: 2022-03-28 | Stop reason: SDUPTHER

## 2022-03-10 ENCOUNTER — TELEPHONE (OUTPATIENT)
Dept: GASTROENTEROLOGY | Facility: CLINIC | Age: 67
End: 2022-03-10

## 2022-03-10 NOTE — TELEPHONE ENCOUNTER
[phone]- left message for patient to call back to schedule screening colonoscopy with Alejandro

## 2022-03-23 ENCOUNTER — TELEPHONE (OUTPATIENT)
Dept: GASTROENTEROLOGY | Facility: CLINIC | Age: 67
End: 2022-03-23

## 2022-03-23 NOTE — TELEPHONE ENCOUNTER
Pt is ready to schedule procedure with Dr. Allen.  She would like to make it on a Tuesday .  [phone]

## 2022-03-28 ENCOUNTER — OFFICE VISIT (OUTPATIENT)
Dept: FAMILY MEDICINE CLINIC | Facility: CLINIC | Age: 67
End: 2022-03-28

## 2022-03-28 VITALS
BODY MASS INDEX: 31.58 KG/M2 | WEIGHT: 185 LBS | SYSTOLIC BLOOD PRESSURE: 122 MMHG | DIASTOLIC BLOOD PRESSURE: 72 MMHG | HEIGHT: 64 IN | OXYGEN SATURATION: 99 % | RESPIRATION RATE: 18 BRPM | HEART RATE: 70 BPM

## 2022-03-28 DIAGNOSIS — E78.2 MIXED HYPERLIPIDEMIA: ICD-10-CM

## 2022-03-28 DIAGNOSIS — I10 BENIGN ESSENTIAL HYPERTENSION: ICD-10-CM

## 2022-03-28 DIAGNOSIS — Z13.89 ENCOUNTER FOR SCREENING FOR OTHER DISORDER: Primary | ICD-10-CM

## 2022-03-28 DIAGNOSIS — E11.9 CONTROLLED TYPE 2 DIABETES MELLITUS WITHOUT COMPLICATION, WITHOUT LONG-TERM CURRENT USE OF INSULIN: ICD-10-CM

## 2022-03-28 DIAGNOSIS — E03.8 ADULT ONSET HYPOTHYROIDISM: ICD-10-CM

## 2022-03-28 PROCEDURE — 99214 OFFICE O/P EST MOD 30 MIN: CPT | Performed by: FAMILY MEDICINE

## 2022-03-28 RX ORDER — IRBESARTAN AND HYDROCHLOROTHIAZIDE 300; 12.5 MG/1; MG/1
1 TABLET, FILM COATED ORAL DAILY
Qty: 90 TABLET | Refills: 1 | Status: SHIPPED | OUTPATIENT
Start: 2022-03-28 | End: 2022-06-20 | Stop reason: SDUPTHER

## 2022-03-28 RX ORDER — LEVOTHYROXINE SODIUM 137 UG/1
137 TABLET ORAL DAILY
Qty: 90 TABLET | Refills: 0 | Status: SHIPPED | OUTPATIENT
Start: 2022-03-28 | End: 2022-06-20 | Stop reason: SDUPTHER

## 2022-03-28 RX ORDER — METOPROLOL SUCCINATE 100 MG/1
100 TABLET, EXTENDED RELEASE ORAL DAILY
Qty: 90 TABLET | Refills: 1 | Status: SHIPPED | OUTPATIENT
Start: 2022-03-28 | End: 2022-06-20 | Stop reason: SDUPTHER

## 2022-03-28 RX ORDER — AMLODIPINE BESYLATE 10 MG/1
10 TABLET ORAL DAILY
Qty: 90 TABLET | Refills: 1 | Status: SHIPPED | OUTPATIENT
Start: 2022-03-28 | End: 2022-06-20 | Stop reason: SDUPTHER

## 2022-03-28 NOTE — PROGRESS NOTES
Assessment and Plan     Problem List Items Addressed This Visit        Cardiac and Vasculature    Benign essential hypertension    Overview     [facility] 3/28/2022  BP is controlled well. She will recheck in six months. She will continue present meds. Prescription is sent today. .              Relevant Medications    amLODIPine (NORVASC) 10 MG tablet    irbesartan-hydrochlorothiazide (AVALIDE) 300-12.5 MG tablet    metoprolol succinate XL (TOPROL-XL) 100 MG 24 hr tablet    Other Relevant Orders    Comprehensive Metabolic Panel    Mixed hyperlipidemia    Overview     [facility] 3/28/2022  Patient has adamantly refused to take a statin. She understands that this increases her risk of stroke and heart disease. She does have a good ratio. Will order labs today   Lab Results   Component Value Date/Time     (H) 01/13/2021 09:42 AM   HDL 60             Relevant Orders    Lipid Panel With LDL / HDL Ratio       Endocrine and Metabolic    Adult onset hypothyroidism    Overview     [facility] 3/28/2022  Labs ordered. She will go to the hospital to have them drawn in the next couple of weeks.           Relevant Medications    metoprolol succinate XL (TOPROL-XL) 100 MG 24 hr tablet    levothyroxine (SYNTHROID, LEVOTHROID) 137 MCG tablet    Other Relevant Orders    TSH    Diabetes mellitus type 2, controlled (HCC)    Overview     [facility] 3/28/2022  Labs ordered today           Relevant Orders    Hemoglobin A1c    Microalbumin / Creatinine Urine Ratio - Urine, Clean Catch      Other Visit Diagnoses     Encounter for screening for other disorder    -  Primary        Return in about 6 months (around 9/28/2022).    Patient was given instructions and counseling regarding her condition or for health maintenance advice. Please see specific information pulled into the AVS if appropriate.        Micheline is a 67 y.o. being seen today for  Hypertension, Diabetes, Hyperlipidemia, Hypothyroidism, and Insomnia   Subjective   History of the  Present Illness   Had COVID in January. She is not fasting today. Last labs in 2021. Having some issues with sleep. Mom was in a bad accident and lost sight in her eye. Difficulty getting to sleep. She is trying to turn off the screens at night.   Social History  She  reports that she has never smoked. She has never used smokeless tobacco. She reports that she does not drink alcohol and does not use drugs.  Objective   Vital Signs        BP Readings from Last 1 Encounters:   03/28/22 122/72     Wt Readings from Last 3 Encounters:   03/28/22 83.9 kg (185 lb)   02/24/22 77.1 kg (170 lb)   08/30/21 81.2 kg (179 lb)   Body mass index is 31.76 kg/m².     Physical Exam  Vitals reviewed.   Constitutional:       Appearance: Normal appearance. She is well-developed and normal weight.      Comments: Mask in place    Cardiovascular:      Rate and Rhythm: Normal rate and regular rhythm.      Heart sounds: Normal heart sounds.   Pulmonary:      Effort: Pulmonary effort is normal.      Breath sounds: Normal breath sounds.   Neurological:      Mental Status: She is alert.   Psychiatric:         Behavior: Behavior normal.         Thought Content: Thought content normal.         Judgment: Judgment normal.

## 2022-03-30 ENCOUNTER — TELEPHONE (OUTPATIENT)
Dept: GASTROENTEROLOGY | Facility: CLINIC | Age: 67
End: 2022-03-30

## 2022-03-30 NOTE — TELEPHONE ENCOUNTER
Pt would like to schedule procedure for a Tuesday.   Please call to schedule.  Please leave message. [phone]

## 2022-04-26 DIAGNOSIS — E11.9 CONTROLLED TYPE 2 DIABETES MELLITUS WITHOUT COMPLICATION, WITHOUT LONG-TERM CURRENT USE OF INSULIN: ICD-10-CM

## 2022-04-26 RX ORDER — METFORMIN HYDROCHLORIDE 750 MG/1
TABLET, EXTENDED RELEASE ORAL
Qty: 90 TABLET | Refills: 1 | Status: SHIPPED | OUTPATIENT
Start: 2022-04-26 | End: 2022-06-20 | Stop reason: SDUPTHER

## 2022-04-27 PROBLEM — Z83.71 FAMILY HISTORY OF COLONIC POLYPS: Status: ACTIVE | Noted: 2022-04-27

## 2022-04-27 PROBLEM — Z83.719 FAMILY HISTORY OF COLONIC POLYPS: Status: ACTIVE | Noted: 2022-04-27

## 2022-04-27 PROBLEM — K63.5 COLON POLYP: Status: ACTIVE | Noted: 2022-04-27

## 2022-05-05 ENCOUNTER — LAB (OUTPATIENT)
Dept: LAB | Facility: HOSPITAL | Age: 67
End: 2022-05-05

## 2022-05-05 LAB
ALBUMIN SERPL-MCNC: 4.4 G/DL (ref 3.5–5.2)
ALBUMIN UR-MCNC: 3.5 MG/DL
ALBUMIN/GLOB SERPL: 1.6 G/DL
ALP SERPL-CCNC: 117 U/L (ref 39–117)
ALT SERPL W P-5'-P-CCNC: 20 U/L (ref 1–33)
ANION GAP SERPL CALCULATED.3IONS-SCNC: 10 MMOL/L (ref 5–15)
AST SERPL-CCNC: 22 U/L (ref 1–32)
BILIRUB SERPL-MCNC: 0.4 MG/DL (ref 0–1.2)
BUN SERPL-MCNC: 11 MG/DL (ref 8–23)
BUN/CREAT SERPL: 14.9 (ref 7–25)
CALCIUM SPEC-SCNC: 9.6 MG/DL (ref 8.6–10.5)
CHLORIDE SERPL-SCNC: 106 MMOL/L (ref 98–107)
CHOLEST SERPL-MCNC: 202 MG/DL (ref 0–200)
CO2 SERPL-SCNC: 27 MMOL/L (ref 22–29)
CREAT SERPL-MCNC: 0.74 MG/DL (ref 0.57–1)
CREAT UR-MCNC: 363.6 MG/DL
EGFRCR SERPLBLD CKD-EPI 2021: 88.8 ML/MIN/1.73
GLOBULIN UR ELPH-MCNC: 2.8 GM/DL
GLUCOSE SERPL-MCNC: 130 MG/DL (ref 65–99)
HBA1C MFR BLD: 6.5 % (ref 4.8–5.6)
HDLC SERPL-MCNC: 59 MG/DL (ref 40–60)
LDLC SERPL CALC-MCNC: 123 MG/DL (ref 0–100)
LDLC/HDLC SERPL: 2.04 {RATIO}
MICROALBUMIN/CREAT UR: 9.6 MG/G
POTASSIUM SERPL-SCNC: 4 MMOL/L (ref 3.5–5.2)
PROT SERPL-MCNC: 7.2 G/DL (ref 6–8.5)
SODIUM SERPL-SCNC: 143 MMOL/L (ref 136–145)
TRIGL SERPL-MCNC: 114 MG/DL (ref 0–150)
TSH SERPL DL<=0.05 MIU/L-ACNC: 3.88 UIU/ML (ref 0.27–4.2)
VLDLC SERPL-MCNC: 20 MG/DL (ref 5–40)

## 2022-05-05 PROCEDURE — 36415 COLL VENOUS BLD VENIPUNCTURE: CPT | Performed by: FAMILY MEDICINE

## 2022-05-05 PROCEDURE — 80061 LIPID PANEL: CPT | Performed by: FAMILY MEDICINE

## 2022-05-05 PROCEDURE — 84443 ASSAY THYROID STIM HORMONE: CPT | Performed by: FAMILY MEDICINE

## 2022-05-05 PROCEDURE — 83036 HEMOGLOBIN GLYCOSYLATED A1C: CPT | Performed by: FAMILY MEDICINE

## 2022-05-05 PROCEDURE — 80053 COMPREHEN METABOLIC PANEL: CPT | Performed by: FAMILY MEDICINE

## 2022-05-05 PROCEDURE — 82570 ASSAY OF URINE CREATININE: CPT | Performed by: FAMILY MEDICINE

## 2022-05-05 PROCEDURE — 82043 UR ALBUMIN QUANTITATIVE: CPT | Performed by: FAMILY MEDICINE

## 2022-05-09 ENCOUNTER — OFFICE VISIT (OUTPATIENT)
Dept: FAMILY MEDICINE CLINIC | Facility: CLINIC | Age: 67
End: 2022-05-09

## 2022-05-09 VITALS
OXYGEN SATURATION: 99 % | HEART RATE: 66 BPM | HEIGHT: 64 IN | WEIGHT: 178 LBS | SYSTOLIC BLOOD PRESSURE: 130 MMHG | RESPIRATION RATE: 18 BRPM | DIASTOLIC BLOOD PRESSURE: 78 MMHG | BODY MASS INDEX: 30.39 KG/M2

## 2022-05-09 DIAGNOSIS — M25.562 ACUTE PAIN OF LEFT KNEE: Primary | ICD-10-CM

## 2022-05-09 PROCEDURE — 99212 OFFICE O/P EST SF 10 MIN: CPT | Performed by: FAMILY MEDICINE

## 2022-05-09 NOTE — PROGRESS NOTES
Assessment and Plan     Problem List Items Addressed This Visit    None     Visit Diagnoses     Acute pain of left knee    -  Primary    Relevant Orders    Ambulatory Referral to Orthopedic Surgery        Patient was given instructions and counseling regarding her condition or for health maintenance advice. Please see specific information pulled into the AVS if appropriate.        Micheline is a 67 y.o. being seen today for  Pain (Knee left down leg into calf  for 2.5 weeks)   Subjective   History of the Present Illness   Started about 2.5 weeks ago. Got a little better. Then got worse again. Using tylenol. Has worn a brace in the past.   Social History  She  reports that she has never smoked. She has never used smokeless tobacco. She reports that she does not drink alcohol and does not use drugs.  Objective   Vital Signs        BP Readings from Last 1 Encounters:   05/09/22 130/78     Wt Readings from Last 3 Encounters:   05/09/22 80.7 kg (178 lb)   03/28/22 83.9 kg (185 lb)   02/24/22 77.1 kg (170 lb)   Body mass index is 30.55 kg/m².     Physical Exam  Vitals reviewed.   Constitutional:       Appearance: Normal appearance. She is well-developed.   Musculoskeletal:         General: Swelling (mild effusion) present.   Neurological:      Mental Status: She is alert.   Psychiatric:         Behavior: Behavior normal.         Thought Content: Thought content normal.         Judgment: Judgment normal.

## 2022-05-16 ENCOUNTER — OFFICE VISIT (OUTPATIENT)
Dept: ORTHOPEDIC SURGERY | Facility: CLINIC | Age: 67
End: 2022-05-16

## 2022-05-16 VITALS — TEMPERATURE: 96.2 F | WEIGHT: 183.8 LBS | BODY MASS INDEX: 31.38 KG/M2 | HEIGHT: 64 IN

## 2022-05-16 DIAGNOSIS — S89.90XA KNEE INJURY, INITIAL ENCOUNTER: ICD-10-CM

## 2022-05-16 DIAGNOSIS — R52 PAIN: Primary | ICD-10-CM

## 2022-05-16 PROCEDURE — 99214 OFFICE O/P EST MOD 30 MIN: CPT | Performed by: NURSE PRACTITIONER

## 2022-05-16 PROCEDURE — 73562 X-RAY EXAM OF KNEE 3: CPT | Performed by: NURSE PRACTITIONER

## 2022-05-16 NOTE — PROGRESS NOTES
Patient: Micheline Cervantes  YOB: 1955 67 y.o. female  Medical Record Number: [id number]    Chief Complaints:   Chief Complaint   Patient presents with   • Left Knee - Initial Evaluation       History of Present Illness:Micheline Cervantes is a 67 y.o. female who presents as a new patient both myself as well as the practice with complaints of left knee pain.  She reports it started 3 weeks ago after twisting her knee in the middle of the night.  She had acute onset of pain as well as effusion which has persisted since.  Patient reports severe constant sharp type pain worse with any standing walking, only slightly better with rest.  She does not recall ever having any issues with her left knee before    Allergies:   Allergies   Allergen Reactions   • Contrast Dye Nausea And Vomiting   • Statins Nausea Only       Medications:   Current Outpatient Medications   Medication Sig Dispense Refill   • amLODIPine (NORVASC) 10 MG tablet Take 1 tablet by mouth Daily. 90 tablet 1   • B Complex Vitamins (VITAMIN B COMPLEX) capsule capsule Take  by mouth Daily.     • Calcium Acetate, Phos Binder, (CALCIUM ACETATE PO) Take  by mouth.     • Cholecalciferol (VITAMIN D3) 2000 UNITS tablet Take 1 tablet by mouth Daily.     • clotrimazole-betamethasone (LOTRISONE) 1-0.05 % cream Apply 1 application topically to the appropriate area as directed 2 (Two) Times a Day. 45 g 0   • COD LIVER OIL PO Take  by mouth.     • Coenzyme Q10 (CO Q 10 PO) Take  by mouth.     • fluticasone (Flonase) 50 MCG/ACT nasal spray 2 sprays into the nostril(s) as directed by provider Daily. 2 puffs each nostril 9.9 mL 0   • guaiFENesin-codeine (GUAIFENESIN AC) 100-10 MG/5ML liquid Take 10 mL by mouth 3 (Three) Times a Day As Needed for Cough. 200 mL 0   • irbesartan-hydrochlorothiazide (AVALIDE) 300-12.5 MG tablet Take 1 tablet by mouth Daily. 90 tablet 1   • levothyroxine (SYNTHROID, LEVOTHROID) 137 MCG tablet Take 1 tablet by mouth Daily. 90 tablet 0   •  "Magnesium 250 MG tablet Take  by mouth.     • metFORMIN ER (GLUCOPHAGE-XR) 750 MG 24 hr tablet TAKE ONE TABLET BY MOUTH EVERY MORNING BEFORE BREAKFAST 90 tablet 1   • metoprolol succinate XL (TOPROL-XL) 100 MG 24 hr tablet Take 1 tablet by mouth Daily. 90 tablet 1   • Milk Thistle 250 MG capsule Take  by mouth Daily.     • Multiple Vitamins-Minerals (MULTIVITAMIN ADULT PO) Take 1 tablet by mouth Daily.       No current facility-administered medications for this visit.         The following portions of the patient's history were reviewed and updated as appropriate: allergies, current medications, past family history, past medical history, past social history, past surgical history and problem list.    Review of Systems:   A 14 point review of systems was performed. All systems negative except pertinent positives/negative listed in HPI above    Physical Exam:   Vitals:    05/16/22 0907   Temp: 96.2 °F (35.7 °C)   TempSrc: Temporal   Weight: 83.4 kg (183 lb 12.8 oz)   Height: 162.6 cm (64.02\")       General: A and O x 3, ASA, NAD    SCLERA:    Normal    Skin clear no unusual lesions noted  Left knee patient has 1+ effusion noted with 112 degrees flexion neutral in extension with a positive Kylie negative Lockman calf soft and nontender       Radiology:  Xrays 3views (ap,lateral, sunrise) left knee were ordered and reviewed today secondary to pain and show minimal if any arthritic changes.  No compared to views available    Assessment/Plan: Acute onset left knee pain with recurrent effusions following injury    Patient discussed options, I am concerned she may have torn meniscus given her symptoms and injury.  We will proceed with an MRI of the left knee to further evaluate and the patient will call couple days after regarding results and treatment options, ice, compression, elevation, rest, anti-inflammatories in the meantime      Dee Dee Ca, APRN  5/16/2022  "

## 2022-05-19 ENCOUNTER — TELEPHONE (OUTPATIENT)
Dept: FAMILY MEDICINE CLINIC | Facility: CLINIC | Age: 67
End: 2022-05-19

## 2022-05-19 NOTE — TELEPHONE ENCOUNTER
PATIENT CALLED AND STATES BOTH OF HER FEET ARE SWOLLEN. SHE HAS GONE TO THE SPECIALIST AS SUGGESTED. SHE WILL HAVE MRI DONE ON 6/5/22    PLEASE CALL [phone]     LAST VISIT 5/9/22

## 2022-05-19 NOTE — TELEPHONE ENCOUNTER
PATIENT WANTING TO KNOW WHAT SHE CAN DO FOR THE SWELLING OF HER FEET PLEASE CALL AND ADVISE    [facility] #[phone]

## 2022-06-05 ENCOUNTER — HOSPITAL ENCOUNTER (OUTPATIENT)
Dept: MRI IMAGING | Facility: HOSPITAL | Age: 67
Discharge: HOME | End: 2022-06-05
Admitting: NURSE PRACTITIONER

## 2022-06-05 DIAGNOSIS — S89.90XA KNEE INJURY, INITIAL ENCOUNTER: ICD-10-CM

## 2022-06-05 PROCEDURE — 73721 MRI JNT OF LWR EXTRE W/O DYE: CPT

## 2022-06-20 ENCOUNTER — OFFICE VISIT (OUTPATIENT)
Dept: ORTHOPEDIC SURGERY | Facility: CLINIC | Age: 67
End: 2022-06-20

## 2022-06-20 VITALS — HEIGHT: 64 IN | BODY MASS INDEX: 30.54 KG/M2 | TEMPERATURE: 98.2 F | WEIGHT: 178.9 LBS

## 2022-06-20 DIAGNOSIS — E11.9 CONTROLLED TYPE 2 DIABETES MELLITUS WITHOUT COMPLICATION, WITHOUT LONG-TERM CURRENT USE OF INSULIN: ICD-10-CM

## 2022-06-20 DIAGNOSIS — I10 BENIGN ESSENTIAL HYPERTENSION: ICD-10-CM

## 2022-06-20 DIAGNOSIS — E03.8 ADULT ONSET HYPOTHYROIDISM: ICD-10-CM

## 2022-06-20 DIAGNOSIS — S83.242A OTHER TEAR OF MEDIAL MENISCUS OF LEFT KNEE AS CURRENT INJURY, INITIAL ENCOUNTER: Primary | ICD-10-CM

## 2022-06-20 PROCEDURE — 99214 OFFICE O/P EST MOD 30 MIN: CPT | Performed by: ORTHOPAEDIC SURGERY

## 2022-06-20 RX ORDER — MELOXICAM 15 MG/1
TABLET ORAL
Qty: 30 TABLET | Refills: 0 | Status: SHIPPED | OUTPATIENT
Start: 2022-06-20 | End: 2022-06-20 | Stop reason: SDUPTHER

## 2022-06-20 NOTE — PROGRESS NOTES
New Knee      Patient: Micheline Cervantes        YOB: 1955    Medical Record Number: [id number]        Chief Complaints: Left knee pain      History of Present Illness: This is a 67-year-old female who presents complaining of left knee pain this been ongoing for 4 weeks no history injury change in activity she has significant swelling no night pain her pain is medial some anterior moderate intermittent aching worse with activity somewhat better with rest past medical history is remarked for abdominal pain hypertension hypothyroidism diabetes and reflux        Allergies:   Allergies   Allergen Reactions   • Contrast Dye Nausea And Vomiting   • Statins Nausea Only       Medications:   Home Medications:  Current Outpatient Medications on File Prior to Visit   Medication Sig   • amLODIPine (NORVASC) 10 MG tablet Take 1 tablet by mouth Daily.   • B Complex Vitamins (VITAMIN B COMPLEX) capsule capsule Take  by mouth Daily.   • Calcium Acetate, Phos Binder, (CALCIUM ACETATE PO) Take  by mouth.   • Cholecalciferol (VITAMIN D3) 2000 UNITS tablet Take 1 tablet by mouth Daily.   • clotrimazole-betamethasone (LOTRISONE) 1-0.05 % cream Apply 1 application topically to the appropriate area as directed 2 (Two) Times a Day.   • COD LIVER OIL PO Take  by mouth.   • Coenzyme Q10 (CO Q 10 PO) Take  by mouth.   • fluticasone (Flonase) 50 MCG/ACT nasal spray 2 sprays into the nostril(s) as directed by provider Daily. 2 puffs each nostril   • guaiFENesin-codeine (GUAIFENESIN AC) 100-10 MG/5ML liquid Take 10 mL by mouth 3 (Three) Times a Day As Needed for Cough.   • irbesartan-hydrochlorothiazide (AVALIDE) 300-12.5 MG tablet Take 1 tablet by mouth Daily.   • levothyroxine (SYNTHROID, LEVOTHROID) 137 MCG tablet Take 1 tablet by mouth Daily.   • Magnesium 250 MG tablet Take  by mouth.   • metFORMIN ER (GLUCOPHAGE-XR) 750 MG 24 hr tablet TAKE ONE TABLET BY MOUTH EVERY MORNING BEFORE BREAKFAST   • metoprolol succinate XL  (TOPROL-XL) 100 MG 24 hr tablet Take 1 tablet by mouth Daily.   • Milk Thistle 250 MG capsule Take  by mouth Daily.   • Multiple Vitamins-Minerals (MULTIVITAMIN ADULT PO) Take 1 tablet by mouth Daily.     No current facility-administered medications on file prior to visit.     Current Medications:  Scheduled Meds:  Continuous Infusions:No current facility-administered medications for this visit.    PRN Meds:.    Past Medical History:   Diagnosis Date   • Abdominal pain    • Asthma    • Diabetes mellitus (HCC)    • GERD (gastroesophageal reflux disease)    • Hypertension    • Hypothyroidism    • Kidney stones    • Sleep apnea with use of continuous positive airway pressure (CPAP)         Past Surgical History:   Procedure Laterality Date   • COLONOSCOPY  approx 2016    repeat 5 years -per patient-normal   • ENDOSCOPY N/A 4/17/2018    Procedure: ESOPHAGOGASTRODUODENOSCOPY with cold biopsies;  Surgeon: Zev Ludwig MD;  Location: [facility];  Service: Gastroenterology   • HYSTERECTOMY      In the 1990's   • LUNG SURGERY      Had a blockage around 2002 -- 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)   • THYROIDECTOMY      in the 80s        Social History     Occupational History   • Not on file   Tobacco Use   • Smoking status: Never Smoker   • Smokeless tobacco: Never Used   Vaping Use   • Vaping Use: Never used   Substance and Sexual Activity   • Alcohol use: No   • Drug use: No   • Sexual activity: Defer      Social History     Social History Narrative   • Not on file        Family History   Problem Relation Age of Onset   • Diabetes Father    • Hypertension Father    • Goiter Mother    • Colon polyps Mother    • Colon cancer Maternal Aunt    • Lung cancer Maternal Uncle    • Thyroid disease Maternal Grandmother    • Malig Hyperthermia Neg Hx              Review of Systems:     Review of Systems      Physical Exam: 67 y.o. female  General Appearance:    Alert, cooperative, in no acute distress     "               Vitals:    06/20/22 1443   Temp: 98.2 °F (36.8 °C)   Weight: 81.1 kg (178 lb 14.4 oz)   Height: 162.6 cm (64\")   PainSc:   8   PainLoc: Knee      Patient is alert and read ×3 no acute distress appears her above-listed at height weight and age.  Affect is normal respiratory rate is normal unlabored. Heart rate regular rate rhythm, sclera, dentition and hearing are normal for the purpose of this exam.        Ortho Exam  .[email or web address]    Physical exam of the left knee reveals no effusion no redness.  The patient does have tenderness about the medial joint line.  No tenderness about the lateral joint line.  A negative bounce home and a positive medial Kylie.  There is some pain medially  with a lateral Kylie.  Patient has a stable ligamentous exam.  Quads are reasonable and symmetric bilaterally.  Calf is soft and nontender.  There is no overlying skin changes no lymphedema lymphadenopathy.  Patient has good hip range of motion full symmetric and asymptomatic and a normal ankle exam.  Procedures             Radiology:   AP, Lateral and merchant views of the left knee  were /reviewed to evauateknee pain.  She does have some narrowing of her right medial compartment on the left when she has good maintenance of joint space also reviewed MRI which shows a posterior horn medial meniscus some mild degenerative changes I have reviewed and agree  Imaging Results (Most Recent)     None        Assessment/Plan:        Left knee medial meniscus tear plan she like to try everything conservative first therefore we will put her on some meloxicam with strict precautions have her see physical therapy working on quad and core strengthening I will see her back in 4 weeks if she fails to improve with all this we can certainly proceed with arthroscopy                        "

## 2022-06-20 NOTE — TELEPHONE ENCOUNTER
Caller: Micheline Cervantes    Relationship: Self    Requested Prescriptions:   Requested Prescriptions     Pending Prescriptions Disp Refills   • amLODIPine (NORVASC) 10 MG tablet 90 tablet 1     Sig: Take 1 tablet by mouth Daily.   • irbesartan-hydrochlorothiazide (AVALIDE) 300-12.5 MG tablet 90 tablet 1     Sig: Take 1 tablet by mouth Daily.   • levothyroxine (SYNTHROID, LEVOTHROID) 137 MCG tablet 90 tablet 0     Sig: Take 1 tablet by mouth Daily.   • meloxicam (MOBIC) 15 MG tablet 30 tablet 0     Si PO Daily with food.   • metFORMIN ER (GLUCOPHAGE-XR) 750 MG 24 hr tablet 90 tablet 1     Sig: Take 1 tablet by mouth Every Morning Before Breakfast.   • metoprolol succinate XL (TOPROL-XL) 100 MG 24 hr tablet 90 tablet 1     Sig: Take 1 tablet by mouth Daily.        Pharmacy where request should be sent: [address] - [phone]  - [phone] FX     Additional details provided by patient: PATIENT STATES THAT SHE MOVED OVER THE WEEKEND AND ALL HER MEDICATIONS GOT PACKED UP AS WELL AND SHE HAS NO WAY TO ACCESS THEM.  PATIENT IS REQUESTING A REFILL ON ALL HER MEDICATIONS ALONG WITH A 90 DAY SUPPLY.      Does the patient have less than a 3 day supply:  [x] Yes  [] No    Gertrude YUEN   22 16:42 EDT

## 2022-06-21 RX ORDER — METFORMIN HYDROCHLORIDE 750 MG/1
750 TABLET, EXTENDED RELEASE ORAL
Qty: 90 TABLET | Refills: 1 | Status: SHIPPED | OUTPATIENT
Start: 2022-06-21

## 2022-06-21 RX ORDER — AMLODIPINE BESYLATE 10 MG/1
10 TABLET ORAL DAILY
Qty: 90 TABLET | Refills: 1 | Status: SHIPPED | OUTPATIENT
Start: 2022-06-21

## 2022-06-21 RX ORDER — MELOXICAM 15 MG/1
TABLET ORAL
Qty: 30 TABLET | Refills: 0 | Status: SHIPPED | OUTPATIENT
Start: 2022-06-21 | End: 2022-07-25 | Stop reason: ALTCHOICE

## 2022-06-21 RX ORDER — IRBESARTAN AND HYDROCHLOROTHIAZIDE 300; 12.5 MG/1; MG/1
1 TABLET, FILM COATED ORAL DAILY
Qty: 90 TABLET | Refills: 1 | Status: SHIPPED | OUTPATIENT
Start: 2022-06-21

## 2022-06-21 RX ORDER — LEVOTHYROXINE SODIUM 137 UG/1
137 TABLET ORAL DAILY
Qty: 90 TABLET | Refills: 0 | Status: SHIPPED | OUTPATIENT
Start: 2022-06-21 | End: 2022-09-20

## 2022-06-21 RX ORDER — METOPROLOL SUCCINATE 100 MG/1
100 TABLET, EXTENDED RELEASE ORAL DAILY
Qty: 90 TABLET | Refills: 1 | Status: SHIPPED | OUTPATIENT
Start: 2022-06-21

## 2022-06-27 ENCOUNTER — TELEPHONE (OUTPATIENT)
Dept: GASTROENTEROLOGY | Facility: CLINIC | Age: 67
End: 2022-06-27

## 2022-06-27 NOTE — TELEPHONE ENCOUNTER
Caller: Micheline Cervantes    Relationship to patient: Self    Best call back number: [phone]    Type of visit: COLONOSCOPY      If rescheduling, when is the original appointment: 07.05.22    Additional notes:PT REQUESTS CALL BACK TO RESCHEDULE HER COLONOSCOPY WITH DR. JAIN.

## 2022-07-01 DIAGNOSIS — R92.8 ABNORMAL MAMMOGRAM: Primary | ICD-10-CM

## 2022-07-12 ENCOUNTER — TELEPHONE (OUTPATIENT)
Dept: ORTHOPEDIC SURGERY | Facility: CLINIC | Age: 67
End: 2022-07-12

## 2022-07-12 DIAGNOSIS — S83.242A OTHER TEAR OF MEDIAL MENISCUS OF LEFT KNEE AS CURRENT INJURY, INITIAL ENCOUNTER: Primary | ICD-10-CM

## 2022-07-12 NOTE — TELEPHONE ENCOUNTER
Caller: Micheline Cervantes    Relationship to patient: Self    Best call back number: [phone]    Chief complaint: PT HAD TO RESCHEDULE COLONOSCOPY BECAUSE HER MOTHER WAS HAVING SURGERY. PT STILL HAS NOT RECEIVED A CALL BACK TO RESCHEDULE.     Type of visit: COLONOSCOPY    Requested date: FIRST AVAILABLE    If rescheduling, when is the original appointment: 07/05/22

## 2022-07-12 NOTE — TELEPHONE ENCOUNTER
Caller: BLU HOLLINS     Relationship: PATIENT    Best call back number: [phone]    What orders are you requesting (i.e. lab or imaging): PHYSICAL THERAPY LEFT KNEE    In what timeframe would the patient need to come in: LAST APPOINTMENT 06/20/22 AND PATIENT UNDERSTOOD DR CHURCHILL TO SAY AN ORDER FOR PHY THERAPY WOULD BE PUT IN.  PATIENT HAS NOT HEARD ANYTHING FROM PHYSICAL THERAPY AND HER KNEE IS REALLY BOTHERING HER.

## 2022-07-25 ENCOUNTER — HOSPITAL ENCOUNTER (OUTPATIENT)
Dept: CARDIOLOGY | Facility: HOSPITAL | Age: 67
Discharge: HOME | End: 2022-07-25
Admitting: ORTHOPAEDIC SURGERY

## 2022-07-25 ENCOUNTER — OFFICE VISIT (OUTPATIENT)
Dept: ORTHOPEDIC SURGERY | Facility: CLINIC | Age: 67
End: 2022-07-25

## 2022-07-25 VITALS — TEMPERATURE: 97.7 F | BODY MASS INDEX: 30.15 KG/M2 | WEIGHT: 176.6 LBS | HEIGHT: 64 IN

## 2022-07-25 DIAGNOSIS — M79.89 PAIN AND SWELLING OF LEFT LOWER LEG: Primary | ICD-10-CM

## 2022-07-25 DIAGNOSIS — M79.662 PAIN AND SWELLING OF LEFT LOWER LEG: Primary | ICD-10-CM

## 2022-07-25 DIAGNOSIS — M79.89 PAIN AND SWELLING OF LEFT LOWER LEG: ICD-10-CM

## 2022-07-25 DIAGNOSIS — M79.662 PAIN AND SWELLING OF LEFT LOWER LEG: ICD-10-CM

## 2022-07-25 LAB
BH CV LOWER VASCULAR LEFT COMMON FEMORAL AUGMENT: NORMAL
BH CV LOWER VASCULAR LEFT COMMON FEMORAL COMPETENT: NORMAL
BH CV LOWER VASCULAR LEFT COMMON FEMORAL COMPRESS: NORMAL
BH CV LOWER VASCULAR LEFT COMMON FEMORAL PHASIC: NORMAL
BH CV LOWER VASCULAR LEFT COMMON FEMORAL SPONT: NORMAL
BH CV LOWER VASCULAR LEFT DISTAL FEMORAL COMPRESS: NORMAL
BH CV LOWER VASCULAR LEFT GASTRONEMIUS COMPRESS: NORMAL
BH CV LOWER VASCULAR LEFT GREATER SAPH AK COMPRESS: NORMAL
BH CV LOWER VASCULAR LEFT GREATER SAPH BK COMPRESS: NORMAL
BH CV LOWER VASCULAR LEFT LESSER SAPH COMPRESS: NORMAL
BH CV LOWER VASCULAR LEFT MID FEMORAL AUGMENT: NORMAL
BH CV LOWER VASCULAR LEFT MID FEMORAL COMPETENT: NORMAL
BH CV LOWER VASCULAR LEFT MID FEMORAL COMPRESS: NORMAL
BH CV LOWER VASCULAR LEFT MID FEMORAL PHASIC: NORMAL
BH CV LOWER VASCULAR LEFT MID FEMORAL SPONT: NORMAL
BH CV LOWER VASCULAR LEFT PERONEAL COMPRESS: NORMAL
BH CV LOWER VASCULAR LEFT POPLITEAL AUGMENT: NORMAL
BH CV LOWER VASCULAR LEFT POPLITEAL COMPETENT: NORMAL
BH CV LOWER VASCULAR LEFT POPLITEAL COMPRESS: NORMAL
BH CV LOWER VASCULAR LEFT POPLITEAL PHASIC: NORMAL
BH CV LOWER VASCULAR LEFT POPLITEAL SPONT: NORMAL
BH CV LOWER VASCULAR LEFT POSTERIOR TIBIAL COMPRESS: NORMAL
BH CV LOWER VASCULAR LEFT PROFUNDA FEMORAL COMPRESS: NORMAL
BH CV LOWER VASCULAR LEFT PROXIMAL FEMORAL COMPRESS: NORMAL
BH CV LOWER VASCULAR LEFT SAPHENOFEMORAL JUNCTION COMPRESS: NORMAL
BH CV LOWER VASCULAR RIGHT COMMON FEMORAL AUGMENT: NORMAL
BH CV LOWER VASCULAR RIGHT COMMON FEMORAL COMPETENT: NORMAL
BH CV LOWER VASCULAR RIGHT COMMON FEMORAL COMPRESS: NORMAL
BH CV LOWER VASCULAR RIGHT COMMON FEMORAL PHASIC: NORMAL
BH CV LOWER VASCULAR RIGHT COMMON FEMORAL SPONT: NORMAL
MAXIMAL PREDICTED HEART RATE: 153 BPM
STRESS TARGET HR: 130 BPM

## 2022-07-25 PROCEDURE — 93971 EXTREMITY STUDY: CPT

## 2022-07-25 PROCEDURE — 99213 OFFICE O/P EST LOW 20 MIN: CPT | Performed by: ORTHOPAEDIC SURGERY

## 2022-07-25 RX ORDER — MELOXICAM 15 MG/1
TABLET ORAL
Qty: 30 TABLET | Refills: 0 | Status: SHIPPED | OUTPATIENT
Start: 2022-07-25

## 2022-07-25 NOTE — PROGRESS NOTES
Patient: Micheline Cervantes  YOB: 1955  Date of Service: 7/25/2022    Chief Complaints:   Chief Complaint   Patient presents with   • Left Knee - Follow-up, Pain     Left knee pain  Subjective:    History of Present Illness: Pt is seen in the office today with complaints of left knee pain she has an MRI which shows a medial meniscus tear we have been trying to treat this conservatively we will going to do physical therapy which cannot be scheduled until August 1 so that is scheduled.  I did start her on some meloxicam last visit but she did not pick it up but has not started I think that is a reasonable thing to try as well.  She is complaining of little more  Chief Complaint   Patient presents with   • Left Knee - Follow-up, Pain   .          Allergies:   Allergies   Allergen Reactions   • Contrast Dye Nausea And Vomiting   • Statins Nausea Only       Medications:   Home Medications:  Current Outpatient Medications on File Prior to Visit   Medication Sig   • amLODIPine (NORVASC) 10 MG tablet Take 1 tablet by mouth Daily.   • B Complex Vitamins (VITAMIN B COMPLEX) capsule capsule Take  by mouth Daily.   • Calcium Acetate, Phos Binder, (CALCIUM ACETATE PO) Take  by mouth.   • Cholecalciferol (VITAMIN D3) 2000 UNITS tablet Take 1 tablet by mouth Daily.   • clotrimazole-betamethasone (LOTRISONE) 1-0.05 % cream Apply 1 application topically to the appropriate area as directed 2 (Two) Times a Day.   • COD LIVER OIL PO Take  by mouth.   • Coenzyme Q10 (CO Q 10 PO) Take  by mouth.   • fluticasone (Flonase) 50 MCG/ACT nasal spray 2 sprays into the nostril(s) as directed by provider Daily. 2 puffs each nostril   • guaiFENesin-codeine (GUAIFENESIN AC) 100-10 MG/5ML liquid Take 10 mL by mouth 3 (Three) Times a Day As Needed for Cough.   • irbesartan-hydrochlorothiazide (AVALIDE) 300-12.5 MG tablet Take 1 tablet by mouth Daily.   • levothyroxine (SYNTHROID, LEVOTHROID) 137 MCG tablet Take 1 tablet by mouth Daily.    • Magnesium 250 MG tablet Take  by mouth.   • meloxicam (MOBIC) 15 MG tablet 1 PO Daily with food.   • metFORMIN ER (GLUCOPHAGE-XR) 750 MG 24 hr tablet Take 1 tablet by mouth Every Morning Before Breakfast.   • metoprolol succinate XL (TOPROL-XL) 100 MG 24 hr tablet Take 1 tablet by mouth Daily.   • Milk Thistle 250 MG capsule Take  by mouth Daily.   • Multiple Vitamins-Minerals (MULTIVITAMIN ADULT PO) Take 1 tablet by mouth Daily.     No current facility-administered medications on file prior to visit.     Current Medications:  Scheduled Meds:  Continuous Infusions:No current facility-administered medications for this visit.    PRN Meds:.    I have reviewed the patient's medical history in detail and updated the computerized patient record.  Review and summarization of old records include:    Past Medical History:   Diagnosis Date   • Abdominal pain    • Asthma    • Diabetes mellitus (HCC)    • GERD (gastroesophageal reflux disease)    • Hypertension    • Hypothyroidism    • Kidney stones    • Sleep apnea with use of continuous positive airway pressure (CPAP)         Past Surgical History:   Procedure Laterality Date   • COLONOSCOPY  approx 2016    repeat 5 years -per patient-normal   • ENDOSCOPY N/A 4/17/2018    Procedure: ESOPHAGOGASTRODUODENOSCOPY with cold biopsies;  Surgeon: Zev Ludwig MD;  Location: [facility] ENDOSCOPY;  Service: Gastroenterology   • HYSTERECTOMY      In the 1990's   • LUNG SURGERY      Had a blockage around 2002 -- 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)   • THYROIDECTOMY      in the 80s        Social History     Occupational History   • Not on file   Tobacco Use   • Smoking status: Never Smoker   • Smokeless tobacco: Never Used   Vaping Use   • Vaping Use: Never used   Substance and Sexual Activity   • Alcohol use: No   • Drug use: No   • Sexual activity: Defer      Social History     Social History Narrative   • Not on file        Family History   Problem Relation Age  of Onset   • Diabetes Father    • Hypertension Father    • Goiter Mother    • Colon polyps Mother    • Colon cancer Maternal Aunt    • Lung cancer Maternal Uncle    • Thyroid disease Maternal Grandmother    • Malig Hyperthermia Neg Hx        ROS: 14 point review of systems was performed and was negative except for documented findings in HPI and today's encounter.     Allergies:   Allergies   Allergen Reactions   • Contrast Dye Nausea And Vomiting   • Statins Nausea Only     Constitutional:  Denies fever, shaking or chills   Eyes:  Denies change in visual acuity   HENT:  Denies nasal congestion or sore throat   Respiratory:  Denies cough or shortness of breath   Cardiovascular:  Denies chest pain or severe LE edema   GI:  Denies abdominal pain, nausea, vomiting, bloody stools or diarrhea   Musculoskeletal:  Numbness, tingling, or loss of motor function only as noted above in history of present illness.  : Denies painful urination or hematuria  Integument:  Denies rash, lesion or ulceration   Neurologic:  Denies headache or focal weakness  Endocrine:  Denies lymphadenopathy  Psych:  Denies confusion or change in mental status   Hem:  Denies active bleeding      Physical Exam: 67 y.o. female  Wt Readings from Last 3 Encounters:   07/25/22 80.1 kg (176 lb 9.6 oz)   06/20/22 81.1 kg (178 lb 14.4 oz)   05/16/22 83.4 kg (183 lb 12.8 oz)       Body mass index is 30.31 kg/m².  Facility age limit for growth percentiles is 20 years.  Vitals:    07/25/22 1415   Temp: 97.7 °F (36.5 °C)     Vital signs reviewed.   General Appearance:    Alert, cooperative, in no acute distress                    Ortho exam    Physical exam of the left knee reveals no effusion no redness.  The patient does have tenderness about the medial joint line.  No tenderness about the lateral joint line.  A negative bounce home and a positive medial Kylie.  There is some pain medially  with a lateral Kylie.  Patient has a stable ligamentous exam.   Quads are reasonable and symmetric bilaterally.  Calf is soft and tender.  There is no overlying skin changes no lymphedema lymphadenopathy.  Patient has good hip range of motion full symmetric and asymptomatic and a normal ankle exam. swelling in that leg will more pain in the calf today that is new but she does have a little swelling around the foot and ankle          .time    Assessment: left knee pain with an MRI which shows a meniscus tear we are going to continue to try conservative management she will get the meloxicam today with strict precautions for short period of time I will get a Doppler today to rule out DVT and she will start physical therapy.  If she fails all these conservative measures we could consider arthroscopy or cubic    Plan:   Follow up as indicated.  Ice, elevate, and rest as needed.  Discussed conservative measures of pain control including ice, bracing.    Serina Jesus M.D.

## 2022-08-01 ENCOUNTER — TREATMENT (OUTPATIENT)
Dept: PHYSICAL THERAPY | Facility: CLINIC | Age: 67
End: 2022-08-01

## 2022-08-01 DIAGNOSIS — R26.2 DIFFICULTY WALKING: ICD-10-CM

## 2022-08-01 DIAGNOSIS — S83.242D ACUTE MEDIAL MENISCUS TEAR OF LEFT KNEE, SUBSEQUENT ENCOUNTER: ICD-10-CM

## 2022-08-01 DIAGNOSIS — M25.562 ACUTE PAIN OF LEFT KNEE: Primary | ICD-10-CM

## 2022-08-01 PROCEDURE — 97161 PT EVAL LOW COMPLEX 20 MIN: CPT | Performed by: PHYSICAL THERAPIST

## 2022-08-01 PROCEDURE — 97110 THERAPEUTIC EXERCISES: CPT | Performed by: PHYSICAL THERAPIST

## 2022-08-01 PROCEDURE — 97530 THERAPEUTIC ACTIVITIES: CPT | Performed by: PHYSICAL THERAPIST

## 2022-08-01 NOTE — PROGRESS NOTES
"Physical Therapy Initial Evaluation and Plan of Care    Patient: Micheline Cervantes   : 1955  Diagnosis/ICD-10 Code:  Acute pain of left knee [M25.562]  Referring practitioner: Jesi Jesus MD    Subjective Evaluation    History of Present Illness  Onset date: 2 months ago.  Mechanism of injury: Patient awoke one morning 2 months ago with a suddent onset of pain in her (L) medial knee with symptoms travelling down medial lower leg.  Patient describes feeling of \"charley horse\" in (L) calf.  Patient eventually presented to PCP and then referred to Dr. Jesus. MRI revealed a medial meniscus tear.  Dr. Jesus prescribed an anti-inflammatory and recommended PT with the possibility of patient eventually needing surgery.  Patient plans to  the anti-inflammatory medication today.    She reports her symptoms are not improving.  She reports the swelling in her knee is worsening and her knee has started to feel like it wants to give way while walking.  Her knee gets stiff when she sits very long and she has difficulty with her first steps upon coming to stand.  She has pain and difficulty with standing, walking, and stair negotiation. She has stairs at home she negotiates non-reciprocally. The knee pain sometimes interferes with her sleep.    Patient did undergo a doppler (L) LE last week which was negative. PMH asthma, hypertension, hypothyroid, and DM 2.       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  At worst pain ratin  Location: surrounding (L) knee joint line and medially down into her lower leg  Quality: sharp and dull ache  Relieving factors: medications (Tylenol, aspercreme)  Aggravating factors: standing, prolonged positioning, ambulation, squatting, stairs, movement and sleeping  Progression: no change    Diagnostic Tests  MRI studies: abnormal (L) knee medial meniscus " tear    Patient Goals  Patient goals for therapy: decreased pain, increased strength and independence with ADLs/IADLs  Patient goal: decrease pain, stand/walk better, avoid surgery           Subjective Questionnaire: LEFS: 60/80    Objective          Tenderness     Additional Tenderness Details  Mod (+) TTP (L) medial knee at joint line, MCL, medial tibial plateal, popliteal fossa  Min (+) TTP (L) lateral knee at joint line    Active Range of Motion   Left Knee   Flexion: 106 degrees with pain  Extension: 12 degrees with pain  Extensor la degrees with pain    Right Knee   Flexion: 119 degrees   Extension: 0 degrees   Extensor la degrees     Additional Active Range of Motion Details  (L) knee symptoms are most pronounced with knee extension activities    Strength/Myotome Testing     Left Hip   Planes of Motion   Flexion: 4-  Abduction: 3+  Adduction: 3+    Right Hip   Planes of Motion   Flexion: 4  Abduction: 4-  Adduction: 4-    Left Knee   Quadriceps contraction: fair    Right Knee   Flexion: 4  Extension: 4+  Quadriceps contraction: good    Left Ankle/Foot   Dorsiflexion: 4    Right Ankle/Foot   Dorsiflexion: 5    Additional Strength Details  (L) knee MMT deferred due to high pain level    Swelling     Left Knee Girth Measurement (cm)   Joint line: 44.9.    Right Knee Girth Measurement (cm)   Joint line: 42.9.    Ambulation     Observational Gait   Gait: antalgic   Decreased walking speed, stride length, left stance time and right step length.     Additional Observational Gait Details  Patient ambulates short community distances with moderate antalgia, no use of AD  Patient lacks (L) TKE during stance phase of gait          Assessment & Plan     Assessment  Impairments: abnormal gait, abnormal muscle firing, abnormal or restricted ROM, activity intolerance, impaired balance, impaired physical strength, lacks appropriate home exercise program and pain with function  Functional Limitations: sleeping,  walking, uncomfortable because of pain, moving in bed, sitting, standing and stooping  Assessment details: Patient is a 67 y.o female who reports insidious onset of (L) knee pain 2 months ago.  She has been determined via MRI to have a medial meniscus tear and her orthopedic MD is opting for conservative management.  Patient also would like to avoid surgery if at all possible.  She reports symptoms 5-6/10, worst with standing, walking, stair negotiation, and standing after prolonged sitting.  Her sleep is sometimes interrupted by knee pain.  She exhibits moderate (L) knee swelling, decreased and painful (L) knee AROM, decreased (L) LE strength, generalized tenderness, and gait compensations including antalgia.  Her LEFS score is 60/80 indicating a moderate perceived level of functional limitation.  She will benefit from skilled PT services to address these deficits and optimize functional activity and mobility tolerance.  Prognosis: good    Goals  Plan Goals: STGs: to be met in 4 weeks  1. Patient will be independent with initial HEP  2. Patient will report improved (L) knee symptoms 2-4/10 for improved tolerance to weightbearing activities  3. Patient will demonstrate reduction in (L) knee joint line girth to within 1.0 cm of (R) knee as evidence of resolving inflammation  4. Patient will demonstrate improved (L) knee AROM  deg for improved joint mobility  5. Patient will demonstrate good (L) quadriceps mm contraction for improved stability during stance phase of gait    LTGs: to be met in 8 weeks  1. Patient will be independent with progressed HEP  2. Patient will report improved (L) knee symptoms 0-2/10 for improved tolerance to functional mobility  3. Patient will demonstrate improved (L) knee extension AROM to 5 deg for improved stability during ambulation  4. Patient will demonstrate >/= 1/2 MMT grade strength improvement (L) LE for improved functional performance  5. Patient will consistently negotiate  steps within her home reciprocally to promote improved navigation within her home  6. Patient will have improved LEFS score >/= 69/80 for subjective evidence of functional improvement    Plan  Therapy options: will be seen for skilled therapy services  Planned modality interventions: cryotherapy, thermotherapy (hydrocollator packs) and electrical stimulation/Russian stimulation  Planned therapy interventions: ADL retraining, balance/weight-bearing training, fine motor coordination training, gait training, home exercise program, flexibility, joint mobilization, manual therapy, neuromuscular re-education, soft tissue mobilization, strengthening, stretching and therapeutic activities  Frequency: 2x week  Duration in weeks: 8  Treatment plan discussed with: patient        Manual Therapy:         mins  64074;  Therapeutic Exercise:    19     mins  77398;     Neuromuscular Dar:        mins  28029;    Therapeutic Activity:     14     mins  88410;     Gait Training:           mins  40987;     Ultrasound:          mins  22504;    Electrical Stimulation:         mins  73179 ( );  Dry Needling          mins self-pay    Timed Treatment:   33   mins   Total Treatment:     56   mins    PT SIGNATURE: Freda Lawrence PT, DPT, OCS  Electronically signed by: Freda Lawrence PT, 08/01/22, 10:34 AM EDT  [place] License #[id number]     DATE TREATMENT INITIATED: 8/1/2022    Initial Certification  Certification Period: 8/1/2022 thru 10/29/2022  I certify that the therapy services are furnished while this patient is under my care.  The services outlined above are required by this patient, and will be reviewed every 90 days.     PHYSICIAN: Jesi Jesus MD  [id number]                                          DATE:     Please sign and return via fax to [phone]. Thank you, [facility].

## 2022-08-10 ENCOUNTER — TREATMENT (OUTPATIENT)
Dept: PHYSICAL THERAPY | Facility: CLINIC | Age: 67
End: 2022-08-10

## 2022-08-10 DIAGNOSIS — M25.562 ACUTE PAIN OF LEFT KNEE: Primary | ICD-10-CM

## 2022-08-10 DIAGNOSIS — S83.242D ACUTE MEDIAL MENISCUS TEAR OF LEFT KNEE, SUBSEQUENT ENCOUNTER: ICD-10-CM

## 2022-08-10 DIAGNOSIS — R26.2 DIFFICULTY WALKING: ICD-10-CM

## 2022-08-10 PROCEDURE — 97110 THERAPEUTIC EXERCISES: CPT | Performed by: PHYSICAL THERAPIST

## 2022-08-10 NOTE — PROGRESS NOTES
Physical Therapy Daily Treatment Note      Patient: Micheline Cervantes   : 1955  Referring practitioner: No ref. provider found  Date of Initial Visit: Type: THERAPY  Noted: 2022  Today's Date: 8/10/2022  Patient seen for 2 sessions         Visit Diagnoses:     ICD-10-CM ICD-9-CM   1. Acute pain of left knee  M25.562 719.46   2. Acute medial meniscus tear of left knee, subsequent encounter  S83.242D V58.89     836.0   3. Difficulty walking  R26.2 719.7         Subjective Evaluation    History of Present Illness    Subjective comment: My knee is doing so-so today.  It was really hurting me yesterday.  I have been doing ok with the exercises at home. I'm doing them on both legs because I have started having some trouble with my right knee from trying to take the pressure off my left knee.Pain  Pain scale: 6-7/10.  Location: (L) medial knee along joint line           Objective   See Exercise, Manual, and Modality Logs for complete treatment.   *Initiated SAQ, SLR, and clamshell    Assessment & Plan     Assessment    Assessment details: Patient benefits from verbal cueing for improved quadriceps activation.  She struggles significantly with SLR (L) LE, performing through partial ROM with apparent great effort, requiring one brief rest break in order to complete five repetitions.  She does exhibit slightly improved tolerance to positions of knee extension but still requires pillow support for sustained knee extension position during ice application.  HEP printout is updated and issued to patient to facilitate recall and compliance.          Progress per Plan of Care         Timed:  Manual Therapy:         mins  92277;  Therapeutic Exercise:    38     mins  41288;     Neuromuscular Dar:        mins  40174;    Therapeutic Activity:          mins  10826;     Gait Training:           mins  79401;     Ultrasound:          mins  77369;    Untimed:  Electrical Stimulation:         mins  65321 ( );  Mechanical  Traction:         mins  56020;   Dry Needling              ___  mins   13778    Timed Treatment:   38   mins   Total Treatment:     48   mins    Freda Lawrence PT, DPT, OCS  Physical Therapist  [place] License #[id number]  Electronically signed by: Freda Lawrence PT, 08/10/22, 2:38 PM EDT

## 2022-08-17 ENCOUNTER — TREATMENT (OUTPATIENT)
Dept: PHYSICAL THERAPY | Facility: CLINIC | Age: 67
End: 2022-08-17

## 2022-08-17 DIAGNOSIS — M25.562 ACUTE PAIN OF LEFT KNEE: Primary | ICD-10-CM

## 2022-08-17 DIAGNOSIS — S83.242D ACUTE MEDIAL MENISCUS TEAR OF LEFT KNEE, SUBSEQUENT ENCOUNTER: ICD-10-CM

## 2022-08-17 DIAGNOSIS — R26.2 DIFFICULTY WALKING: ICD-10-CM

## 2022-08-17 PROCEDURE — 97110 THERAPEUTIC EXERCISES: CPT | Performed by: PHYSICAL THERAPIST

## 2022-08-17 NOTE — PROGRESS NOTES
"Physical Therapy Daily Progress Note      Visit # 3      Subjective Evaluation    History of Present Illness    Subjective comment: Pt reports her (L) knee is \"feeling pretty good right now\".  Denies any current pain.       Objective   See Exercise, Manual, and Modality Logs for complete treatment    Assessment & Plan     Assessment    Assessment details: Pt tolerated treatment with mild increase in pain in (L) knee.  Pt was able to increase reps on all exercises.  Pt completed 6 (B) of SLR without a break, but it continues to be a struggle.  Pt benefits from frequent vc for exercise performance.                     Manual Therapy:    0     mins  73405;  Therapeutic Exercise:    29     mins  28109;     Neuromuscular Dar:    0    mins  40041;    Therapeutic Activity:     0     mins  84153;     Gait Trainin     mins  35370;     Ultrasound:     0     mins  86101;    Work Hardening           0      mins 23862  Iontophoresis               0   mins 79679  E-Stim                          _0_ mins 58012 ( )    Timed Treatment:   29   mins   Total Treatment:     39   mins    Duncan An PTA  Physical Therapist Assistant  "

## 2022-08-22 ENCOUNTER — TELEPHONE (OUTPATIENT)
Dept: GASTROENTEROLOGY | Facility: CLINIC | Age: 67
End: 2022-08-22

## 2022-08-22 NOTE — TELEPHONE ENCOUNTER
Caller: Micheline Cervantes    Relationship to patient: Self    Best call back number: [phone]    Type of visit: colonoscopy    Requested date: FIRST AVAILABLE        Additional notes:PLEASE CALL PT TO SCHEDULE COLONOSCOPY

## 2022-08-31 ENCOUNTER — TREATMENT (OUTPATIENT)
Dept: PHYSICAL THERAPY | Facility: CLINIC | Age: 67
End: 2022-08-31

## 2022-08-31 ENCOUNTER — TELEPHONE (OUTPATIENT)
Dept: FAMILY MEDICINE CLINIC | Facility: CLINIC | Age: 67
End: 2022-08-31

## 2022-08-31 DIAGNOSIS — S83.242D ACUTE MEDIAL MENISCUS TEAR OF LEFT KNEE, SUBSEQUENT ENCOUNTER: ICD-10-CM

## 2022-08-31 DIAGNOSIS — M25.562 ACUTE PAIN OF LEFT KNEE: Primary | ICD-10-CM

## 2022-08-31 DIAGNOSIS — R26.2 DIFFICULTY WALKING: ICD-10-CM

## 2022-08-31 PROCEDURE — 97110 THERAPEUTIC EXERCISES: CPT | Performed by: PHYSICAL THERAPIST

## 2022-08-31 NOTE — TELEPHONE ENCOUNTER
INSURANCE IS RECOMMENDING STATIN THERAPY BASED OFF PATIENTS DIABETES DIAGNOSIS SO IF DR. HINSON WOULD LIKE TO PRESCRIBE SOME KIND OF STATIN PLEASE SEND OVER A SCRIPT IF NOT THAT IS FINE JUST LET THE PHARMACY KNOW AS THEY JUST HAVE TO DOCUMENT IT AND GET IT OUT OF THERE SYSTEM.     GUNJAN WITH [employer]> [phone]

## 2022-08-31 NOTE — PROGRESS NOTES
"Re-Assessment / Re-Certification      Patient: Micheline Cervantes   : 1955  Diagnosis/ICD-10 Code:  Acute pain of left knee [M25.562]  Referring practitioner: No ref. provider found  Date of Initial Visit: 2022  Today's Date: 2022  Patient seen for 4 sessions      Subjective:   Subjective Questionnaire: LEFS: 60/80  Clinical Progress: improved  Home Program Compliance: Yes - partial  Treatment has included: therapeutic exercise, neuromuscular re-education and therapeutic activity    Subjective Evaluation    History of Present Illness    Subjective comment: My knee is feeling ok today.  I think it's doing a little better overall. I feel like I'm walking better and not limping as much.  I'll be honest I have been busy and not feeling well with allergies/sinus stuff so I have not been very good about doing my exercises.Pain  Current pain ratin  Location: (L) medial aspect of knee         Objective          Active Range of Motion   Left Knee   Flexion: 128 (\"tightness\") degrees   Extensor la degrees     Ambulation     Observational Gait     Additional Observational Gait Details  Patient's gait is minimally compensated and antalgic for short community distances      Assessment & Plan     Assessment    Assessment details: Patient has been compliant and cooperative with PT interventions.  Though her (L) knee symptoms persist to a moderately high degree, she demonstrates improved (L) knee AROM - in fact now nearly achieving full knee extension - and as a result her gait quality is significantly improved/less compensated.  She is more stable during stance phase of gait due to improved knee extension ROM.  Subjectively she feels her gait has improved as well. She may benefit from a short course of continued skilled PT services to finalize HEP for anticipated transition to independence with progressed HEP.    Goals  Plan Goals: STGs: to be met in 2 weeks  1. Patient will be independent with initial HEP - " MET  2. Patient will report improved (L) knee symptoms 2-4/10 for improved tolerance to weightbearing activities - NOT MET  3. Patient will demonstrate reduction in (L) knee joint line girth to within 1.0 cm of (R) knee as evidence of resolving inflammation - NOT ASSESSED  4. Patient will demonstrate improved (L) knee AROM  deg for improved joint mobility - MET  5. Patient will demonstrate good (L) quadriceps mm contraction for improved stability during stance phase of gait - MET    LTGs: to be met in 4 weeks  1. Patient will be independent with progressed HEP - NOT MET  2. Patient will report improved (L) knee symptoms 0-2/10 for improved tolerance to functional mobility - NOT MET  3. Patient will demonstrate improved (L) knee extension AROM to 5 deg for improved stability during ambulation - MET  4. Patient will demonstrate >/= 1/2 MMT grade strength improvement (L) LE for improved functional performance - NOT ASSESSED  5. Patient will consistently negotiate steps within her home reciprocally to promote improved navigation within her home - NOT MET  6. Patient will have improved LEFS score >/= 69/80 for subjective evidence of functional improvement - NOT MET/IMPROVED      Progress toward previous goals: Partially Met  Recommendations: Continue as planned  Timeframe: 1 month  Prognosis to achieve goals: good    PT Signature: Freda Lawrence, PT, DPT, OCS  [place] License # [id number]      Based upon review of the patient's progress and continued therapy plan, it is my medical opinion that Micheline Cervantes should continue physical therapy treatment at [facility]  [address] [place] [address] [address]  [facility] [address] [phone].    Signature: __________________________________      Manual Therapy:         mins  05216;  Therapeutic Exercise:    27     mins  79622;     Neuromuscular Dar:        mins  85062;    Therapeutic Activity:          mins  45492;     Gait Training:            mins  10552;     Ultrasound:          mins  84976;    Electrical Stimulation:         mins  38388 ( );  Dry Needling          mins self-pay    Timed Treatment:   27   mins   Total Treatment:     37   mins    Please sign and return via fax to [phone]. Thank you, [facility].

## 2022-09-12 ENCOUNTER — TREATMENT (OUTPATIENT)
Dept: PHYSICAL THERAPY | Facility: CLINIC | Age: 67
End: 2022-09-12

## 2022-09-12 DIAGNOSIS — M25.562 ACUTE PAIN OF LEFT KNEE: Primary | ICD-10-CM

## 2022-09-12 DIAGNOSIS — S83.242D ACUTE MEDIAL MENISCUS TEAR OF LEFT KNEE, SUBSEQUENT ENCOUNTER: ICD-10-CM

## 2022-09-12 DIAGNOSIS — R26.2 DIFFICULTY WALKING: ICD-10-CM

## 2022-09-12 PROCEDURE — 97110 THERAPEUTIC EXERCISES: CPT | Performed by: PHYSICAL THERAPIST

## 2022-09-12 NOTE — PROGRESS NOTES
Physical Therapy Daily Treatment Note      Patient: Micheline Cervantes   : 1955  Referring practitioner: Serina Jesus MD  Date of Initial Visit: Type: THERAPY  Noted: 2022  Today's Date: 2022  Patient seen for 5 sessions         Visit Diagnoses:     ICD-10-CM ICD-9-CM   1. Acute pain of left knee  M25.562 719.46   2. Acute medial meniscus tear of left knee, subsequent encounter  S83.242D V58.89     836.0   3. Difficulty walking  R26.2 719.7         Subjective Evaluation    History of Present Illness    Subjective comment: My knee is really bothering me.  I think I slept with it bent more and that bothered it.  I'm not going to lie; I've had a death in the family and a lot going on so I have not done my exercises since I saw you last.Pain  Current pain ratin  Location: (L) anteromedial knee           Objective   See Exercise, Manual, and Modality Logs for complete treatment.       Assessment & Plan     Assessment    Assessment details: Patient benefits from intermittent verbal and tactile cueing to improve proper performance of exercises.  She reports decreased (L) knee symptoms following exercise performance. She is instructed in safe and appropriate exercise progression and is issued a red theraband for use with progression of seated marching and clamshells.  Educated patient in ROM activities and quadriceps muscle activation during prolonged sitting to ease discomfort and stiffness upon arising to stand.  At this time patient exhibits readiness to transition to HEP and has been encouraged in diligent HEP compliance to reduce symptoms and improve function.          Other - allow patient to work independently with HEP.  Encouraged patient to reach out with further questions as they arise.          Timed:  Manual Therapy:         mins  65828;  Therapeutic Exercise:    39     mins  72131;     Neuromuscular Dar:        mins  36368;    Therapeutic Activity:          mins  88186;     Gait  Training:           mins  56947;     Ultrasound:          mins  13792;    Untimed:  Electrical Stimulation:         mins  52951 ( );  Mechanical Traction:         mins  75089;   Dry Needling              ___  mins   20561    Timed Treatment:   39   mins   Total Treatment:     39   mins    Freda Lawrence PT, DPT, [facility]  Physical Therapist  [place] License #[id number]  Electronically signed by: Freda Lawrence PT, 09/12/22, 1:02 PM EDT

## 2022-09-20 DIAGNOSIS — E03.8 ADULT ONSET HYPOTHYROIDISM: ICD-10-CM

## 2022-09-20 RX ORDER — LEVOTHYROXINE SODIUM 137 UG/1
TABLET ORAL
Qty: 90 TABLET | Refills: 0 | Status: SHIPPED | OUTPATIENT
Start: 2022-09-20 | End: 2022-12-29

## 2022-09-27 ENCOUNTER — TELEPHONE (OUTPATIENT)
Dept: GASTROENTEROLOGY | Facility: CLINIC | Age: 67
End: 2022-09-27

## 2022-09-27 NOTE — TELEPHONE ENCOUNTER
Caller: Micheline Cervantes    Relationship to patient: Self    Best call back number: [phone]    Patient is needing:PATIENT CALLED BACK TO SCHEDULE A COLONSCOPY. HAS NOT RECEIVED A CALL BACK AND WOULD REQUEST TO AT LEAST BE UPDATE ON STATUS.

## 2022-10-07 ENCOUNTER — TELEPHONE (OUTPATIENT)
Dept: GASTROENTEROLOGY | Facility: CLINIC | Age: 67
End: 2022-10-07

## 2022-10-07 NOTE — TELEPHONE ENCOUNTER
Caller: Micheline Cervantes    Relationship to patient: Self    Best call back number: [phone]    Patient is needing: PATIENT CALLED IN ASKING TO SCHEDULE COLONOSCOPY. HAS NOT RECEIVED A CALL BACK AND IS REQUESTING CONTACT SINCE HAS NOT HEARD FROM OUR TEAM.

## 2022-10-10 ENCOUNTER — DOCUMENTATION (OUTPATIENT)
Dept: PHYSICAL THERAPY | Facility: CLINIC | Age: 67
End: 2022-10-10

## 2022-10-10 DIAGNOSIS — S83.242D ACUTE MEDIAL MENISCUS TEAR OF LEFT KNEE, SUBSEQUENT ENCOUNTER: ICD-10-CM

## 2022-10-10 DIAGNOSIS — M25.562 ACUTE PAIN OF LEFT KNEE: Primary | ICD-10-CM

## 2022-10-10 DIAGNOSIS — R26.2 DIFFICULTY WALKING: ICD-10-CM

## 2022-10-10 NOTE — PROGRESS NOTES
Discharge Summary  Discharge Summary from Physical Therapy Report     Patient Information  Micheline Cervantes  1955    Dates  PT visit: 08/01/2022 - 09/12/2022  Number of Visits: 5     Discharge Status of Patient: See final note dated 09/12/2022    Goals: Partially Met    Visit Diagnoses:    ICD-10-CM ICD-9-CM   1. Acute pain of left knee  M25.562 719.46   2. Acute medial meniscus tear of left knee, subsequent encounter  S83.242D V58.89     836.0   3. Difficulty walking  R26.2 719.7       Discharge Plan: Continue with current home exercise program as instructed    Date of Discharge 10/10/2022        Freda Lawrence, PT, DPT, OCS  Physical Therapist

## 2022-10-14 ENCOUNTER — TELEPHONE (OUTPATIENT)
Dept: FAMILY MEDICINE CLINIC | Facility: CLINIC | Age: 67
End: 2022-10-14

## 2022-10-14 NOTE — TELEPHONE ENCOUNTER
Caller: Micheline Cervantes    Relationship: Self    Best call back number: [phone]    What was the call regarding: PATIENT IS HAVING ISSUES GETTING SCHEDULED FOR HER COLONOSCOPY. PATIENT STATES THAT SHE HAS ATTEMPTED TO SCHEDULE THIS BUT NEVER RECEIVES A CALL BACK FROM THEM. PLEASE CALL PATIENT BACK.

## 2022-10-19 NOTE — TELEPHONE ENCOUNTER
Caller: Micheline Cervantes    Relationship to patient: Self    Best call back number: [phone]    Patient is needing: PT CALLED TO GET SCHEDULED FOR COLONOSCOPY.    RESCHEDULE FROM 7/5/22 - MOTHER HAD SURGERY       CHIEF COMPLAINT - 5 YR ROUTINE - FAMILY HX OF COLON CANCER  AND HX OF MOTHER & GRANDMOTHER HAS POLYPS\    REQUESTING TO HAVE PROCEDURE AS SOON AS POSSIBLE    AVOID November 4-9 ALREADY HAS APPOINTMENTS

## 2022-10-21 NOTE — TELEPHONE ENCOUNTER
I have reached out to the office for assistance on scheduling her colonoscopy. I am currently awaiting a response.

## 2022-10-21 NOTE — TELEPHONE ENCOUNTER
Spoke with Jennifer GAN. She confirmed patient has been queued for open access paperwork which she will receive in the mail. Once that is returned to the clinic, they will schedule the procedure. Patient has been informed to be on the lookout for the paperwork in the next 1-2 weeks.

## 2022-10-24 ENCOUNTER — TELEPHONE (OUTPATIENT)
Dept: GASTROENTEROLOGY | Facility: CLINIC | Age: 67
End: 2022-10-24

## 2022-10-24 NOTE — TELEPHONE ENCOUNTER
DAMON patient via telephone for. Scheduled 01/17/2023 with arrival time of 0915A . Prep paperwork mailed to verified address on file. Patient advised arrival time may change based on [facility] guidelines. DAMON LEÓN

## 2022-10-24 NOTE — TELEPHONE ENCOUNTER
DAMON patient via telephone for. Scheduled 01/17/2023 with arrival time of 0115P . Prep paperwork mailed to verified address on file. Patient advised arrival time may change based on [facility] guidelines. DAMON LEÓN

## 2022-10-25 ENCOUNTER — TELEPHONE (OUTPATIENT)
Dept: GASTROENTEROLOGY | Facility: CLINIC | Age: 67
End: 2022-10-25

## 2022-10-25 NOTE — TELEPHONE ENCOUNTER
Caller: Micheline Cervantes    Relationship to patient: Self    Best call back number: [phone]    Patient is needing: PLEASE CONFIRM TIME OF PROCEDURE ON 1/17/2023 PATIENT STATED SHE WROTE DOWN TIME FOR MORNING NOT AFTERNOON.    PT GAVE PERMISSION TO LEAVE DETAILS ON VOICEMAIL.

## 2022-12-28 DIAGNOSIS — E03.8 ADULT ONSET HYPOTHYROIDISM: ICD-10-CM

## 2022-12-29 RX ORDER — LEVOTHYROXINE SODIUM 137 UG/1
TABLET ORAL
Qty: 30 TABLET | Refills: 0 | Status: SHIPPED | OUTPATIENT
Start: 2022-12-29

## 2023-01-04 DIAGNOSIS — S83.242A OTHER TEAR OF MEDIAL MENISCUS OF LEFT KNEE AS CURRENT INJURY, INITIAL ENCOUNTER: Primary | ICD-10-CM

## 2023-01-04 RX ORDER — MELOXICAM 15 MG/1
TABLET ORAL
Qty: 30 TABLET | Refills: 0 | OUTPATIENT
Start: 2023-01-04

## 2023-01-04 NOTE — TELEPHONE ENCOUNTER
Spoke with patient, relayed per Dr Jesus that patient will need to request refills of mobic from her primary care so they can monitor her kidney function test. Patient did not request this refill, the pharmacy did, patient said she will not need the prescription

## 2023-01-17 ENCOUNTER — HOSPITAL ENCOUNTER (OUTPATIENT)
Facility: HOSPITAL | Age: 68
Setting detail: HOSPITAL OUTPATIENT SURGERY
Discharge: HOME | End: 2023-01-17
Attending: INTERNAL MEDICINE | Admitting: INTERNAL MEDICINE
Payer: MEDICARE

## 2023-01-17 ENCOUNTER — ANESTHESIA (OUTPATIENT)
Dept: GASTROENTEROLOGY | Facility: HOSPITAL | Age: 68
End: 2023-01-17
Payer: MEDICARE

## 2023-01-17 ENCOUNTER — ANESTHESIA EVENT (OUTPATIENT)
Dept: GASTROENTEROLOGY | Facility: HOSPITAL | Age: 68
End: 2023-01-17
Payer: MEDICARE

## 2023-01-17 VITALS
WEIGHT: 177.8 LBS | BODY MASS INDEX: 30.35 KG/M2 | SYSTOLIC BLOOD PRESSURE: 111 MMHG | DIASTOLIC BLOOD PRESSURE: 71 MMHG | HEART RATE: 66 BPM | HEIGHT: 64 IN | RESPIRATION RATE: 13 BRPM | OXYGEN SATURATION: 96 %

## 2023-01-17 DIAGNOSIS — K63.5 COLON POLYP: ICD-10-CM

## 2023-01-17 DIAGNOSIS — Z83.71 FAMILY HISTORY OF COLONIC POLYPS: ICD-10-CM

## 2023-01-17 LAB — GLUCOSE BLDC GLUCOMTR-MCNC: 179 MG/DL (ref 70–130)

## 2023-01-17 PROCEDURE — 25010000002 PROPOFOL 10 MG/ML EMULSION: Performed by: ANESTHESIOLOGY

## 2023-01-17 PROCEDURE — 82962 GLUCOSE BLOOD TEST: CPT

## 2023-01-17 PROCEDURE — 88305 TISSUE EXAM BY PATHOLOGIST: CPT | Performed by: INTERNAL MEDICINE

## 2023-01-17 PROCEDURE — 45380 COLONOSCOPY AND BIOPSY: CPT | Performed by: INTERNAL MEDICINE

## 2023-01-17 RX ORDER — DIPHENHYDRAMINE HCL 25 MG
25 CAPSULE ORAL
Status: DISCONTINUED | OUTPATIENT
Start: 2023-01-17 | End: 2023-01-17 | Stop reason: HOSPADM

## 2023-01-17 RX ORDER — HYDROMORPHONE HCL 110MG/55ML
0.5 PATIENT CONTROLLED ANALGESIA SYRINGE INTRAVENOUS
Status: DISCONTINUED | OUTPATIENT
Start: 2023-01-17 | End: 2023-01-17 | Stop reason: HOSPADM

## 2023-01-17 RX ORDER — SODIUM CHLORIDE, SODIUM LACTATE, POTASSIUM CHLORIDE, CALCIUM CHLORIDE 600; 310; 30; 20 MG/100ML; MG/100ML; MG/100ML; MG/100ML
1000 INJECTION, SOLUTION INTRAVENOUS CONTINUOUS
Status: DISCONTINUED | OUTPATIENT
Start: 2023-01-17 | End: 2023-01-17 | Stop reason: HOSPADM

## 2023-01-17 RX ORDER — DIPHENHYDRAMINE HYDROCHLORIDE 50 MG/ML
12.5 INJECTION INTRAMUSCULAR; INTRAVENOUS
Status: DISCONTINUED | OUTPATIENT
Start: 2023-01-17 | End: 2023-01-17 | Stop reason: HOSPADM

## 2023-01-17 RX ORDER — LIDOCAINE HYDROCHLORIDE 20 MG/ML
INJECTION, SOLUTION INFILTRATION; PERINEURAL AS NEEDED
Status: DISCONTINUED | OUTPATIENT
Start: 2023-01-17 | End: 2023-01-17 | Stop reason: SURG

## 2023-01-17 RX ORDER — NALOXONE HCL 0.4 MG/ML
0.2 VIAL (ML) INJECTION AS NEEDED
Status: DISCONTINUED | OUTPATIENT
Start: 2023-01-17 | End: 2023-01-17 | Stop reason: HOSPADM

## 2023-01-17 RX ORDER — PROPOFOL 10 MG/ML
VIAL (ML) INTRAVENOUS AS NEEDED
Status: DISCONTINUED | OUTPATIENT
Start: 2023-01-17 | End: 2023-01-17 | Stop reason: SURG

## 2023-01-17 RX ORDER — SODIUM CHLORIDE, SODIUM LACTATE, POTASSIUM CHLORIDE, CALCIUM CHLORIDE 600; 310; 30; 20 MG/100ML; MG/100ML; MG/100ML; MG/100ML
INJECTION, SOLUTION INTRAVENOUS CONTINUOUS PRN
Status: DISCONTINUED | OUTPATIENT
Start: 2023-01-17 | End: 2023-01-17 | Stop reason: SURG

## 2023-01-17 RX ORDER — PROMETHAZINE HYDROCHLORIDE 25 MG/1
25 TABLET ORAL ONCE AS NEEDED
Status: DISCONTINUED | OUTPATIENT
Start: 2023-01-17 | End: 2023-01-17 | Stop reason: HOSPADM

## 2023-01-17 RX ORDER — EPHEDRINE SULFATE 50 MG/ML
5 INJECTION, SOLUTION INTRAVENOUS ONCE AS NEEDED
Status: DISCONTINUED | OUTPATIENT
Start: 2023-01-17 | End: 2023-01-17 | Stop reason: HOSPADM

## 2023-01-17 RX ORDER — HYDRALAZINE HYDROCHLORIDE 20 MG/ML
5 INJECTION INTRAMUSCULAR; INTRAVENOUS
Status: DISCONTINUED | OUTPATIENT
Start: 2023-01-17 | End: 2023-01-17 | Stop reason: HOSPADM

## 2023-01-17 RX ORDER — LABETALOL HYDROCHLORIDE 5 MG/ML
5 INJECTION, SOLUTION INTRAVENOUS
Status: DISCONTINUED | OUTPATIENT
Start: 2023-01-17 | End: 2023-01-17 | Stop reason: HOSPADM

## 2023-01-17 RX ORDER — PROMETHAZINE HYDROCHLORIDE 25 MG/1
25 SUPPOSITORY RECTAL ONCE AS NEEDED
Status: DISCONTINUED | OUTPATIENT
Start: 2023-01-17 | End: 2023-01-17 | Stop reason: HOSPADM

## 2023-01-17 RX ORDER — ONDANSETRON 2 MG/ML
4 INJECTION INTRAMUSCULAR; INTRAVENOUS ONCE AS NEEDED
Status: DISCONTINUED | OUTPATIENT
Start: 2023-01-17 | End: 2023-01-17 | Stop reason: HOSPADM

## 2023-01-17 RX ORDER — HYDROCODONE BITARTRATE AND ACETAMINOPHEN 7.5; 325 MG/1; MG/1
1 TABLET ORAL ONCE AS NEEDED
Status: DISCONTINUED | OUTPATIENT
Start: 2023-01-17 | End: 2023-01-17 | Stop reason: HOSPADM

## 2023-01-17 RX ORDER — FENTANYL CITRATE 50 UG/ML
50 INJECTION, SOLUTION INTRAMUSCULAR; INTRAVENOUS
Status: DISCONTINUED | OUTPATIENT
Start: 2023-01-17 | End: 2023-01-17 | Stop reason: HOSPADM

## 2023-01-17 RX ORDER — FLUMAZENIL 0.1 MG/ML
0.2 INJECTION INTRAVENOUS AS NEEDED
Status: DISCONTINUED | OUTPATIENT
Start: 2023-01-17 | End: 2023-01-17 | Stop reason: HOSPADM

## 2023-01-17 RX ORDER — OXYCODONE AND ACETAMINOPHEN 7.5; 325 MG/1; MG/1
1 TABLET ORAL EVERY 4 HOURS PRN
Status: DISCONTINUED | OUTPATIENT
Start: 2023-01-17 | End: 2023-01-17 | Stop reason: HOSPADM

## 2023-01-17 RX ORDER — PROPOFOL 10 MG/ML
VIAL (ML) INTRAVENOUS CONTINUOUS PRN
Status: DISCONTINUED | OUTPATIENT
Start: 2023-01-17 | End: 2023-01-17 | Stop reason: SURG

## 2023-01-17 RX ADMIN — SODIUM CHLORIDE, POTASSIUM CHLORIDE, SODIUM LACTATE AND CALCIUM CHLORIDE: 600; 310; 30; 20 INJECTION, SOLUTION INTRAVENOUS at 15:02

## 2023-01-17 RX ADMIN — SODIUM CHLORIDE, POTASSIUM CHLORIDE, SODIUM LACTATE AND CALCIUM CHLORIDE 1000 ML: 600; 310; 30; 20 INJECTION, SOLUTION INTRAVENOUS at 13:10

## 2023-01-17 RX ADMIN — Medication 200 MCG/KG/MIN: at 15:07

## 2023-01-17 RX ADMIN — PROPOFOL 100 MG: 10 INJECTION, EMULSION INTRAVENOUS at 15:07

## 2023-01-17 RX ADMIN — LIDOCAINE HYDROCHLORIDE 40 MG: 20 INJECTION, SOLUTION INFILTRATION; PERINEURAL at 15:05

## 2023-01-17 NOTE — DISCHARGE INSTRUCTIONS
For the next 24 hours patient needs to be with a responsible adult.    For 24 hours DO NOT drive, operate machinery, appliances, drink alcohol, make important decisions or sign legal documents.    Start with a light or bland diet if you are feeling sick to your stomach otherwise advance to regular diet as tolerated.    Follow recommendations on procedure report if provided by your doctor.    Call Dr. Allen [phone]    Problems may include but not limited to: large amounts of bleeding, trouble breathing, repeated vomiting, severe unrelieved pain, fever or chills.

## 2023-01-17 NOTE — ANESTHESIA PREPROCEDURE EVALUATION
Anesthesia Evaluation     Patient summary reviewed and Nursing notes reviewed                Airway   Mallampati: I  TM distance: <3 FB  Neck ROM: full  no difficulty expected  Dental - normal exam     Pulmonary - normal exam   (+) sleep apnea,   Cardiovascular - normal exam    (+) hypertension, hyperlipidemia,       Neuro/Psych- negative ROS  GI/Hepatic/Renal/Endo    (+)  GERD,  diabetes mellitus,     Musculoskeletal (-) negative ROS    Abdominal  - normal exam    Bowel sounds: normal.   Substance History - negative use     OB/GYN negative ob/gyn ROS         Other                          Anesthesia Plan    ASA 3     MAC       Anesthetic plan, risks, benefits, and alternatives have been provided, discussed and informed consent has been obtained with: patient.

## 2023-01-17 NOTE — ANESTHESIA POSTPROCEDURE EVALUATION
"Patient: Micheline Cervantes    Procedure Summary     Date: 01/17/23 Room / Location: [facility] ENDOSCOPY 7 /  [facility]    Anesthesia Start: 1502 Anesthesia Stop: 1532    Procedure: COLONOSCOPY to cecum with polypectomy (bx) Diagnosis:       Family history of colonic polyps      Colon polyp      (Family history of colonic polyps [Z83.71])      (Colon polyp [K63.5])    Surgeons: Michael Allen MD Provider: lCaudia Klein MD    Anesthesia Type: MAC ASA Status: 3          Anesthesia Type: MAC    Vitals  Vitals Value Taken Time   /68 01/17/23 1540   Temp     Pulse 68 01/17/23 1540   Resp 11 01/17/23 1540   SpO2 95 % 01/17/23 1540           Post Anesthesia Care and Evaluation    Patient location during evaluation: bedside  Patient participation: complete - patient participated  Level of consciousness: awake  Pain management: adequate    Airway patency: patent  Anesthetic complications: No anesthetic complications    Cardiovascular status: acceptable  Respiratory status: acceptable  Hydration status: acceptable    Comments: /68 (BP Location: Left arm, Patient Position: Lying)   Pulse 68   Resp 11   Ht 162.6 cm (64\")   Wt 80.6 kg (177 lb 12.8 oz)   LMP  (LMP Unknown)   SpO2 95%   BMI 30.52 kg/m²       "

## 2023-01-17 NOTE — H&P
[facility]  Pre Procedure History & Physical    Chief Complaint:   Time for my colonoscopy    Subjective     HPI:   67 y.o. female who has a family history (maternal aunt) of colon cancer.  In addition her mom had colon polyps.  The patient's last colonoscopy was about 5 years ago.    Past Medical History:   Past Medical History:   Diagnosis Date   • Abdominal pain    • Asthma    • Diabetes mellitus (HCC)    • GERD (gastroesophageal reflux disease)    • Hypertension    • Hypothyroidism    • Kidney stones    • Sleep apnea with use of continuous positive airway pressure (CPAP)        Family History:  Family History   Problem Relation Age of Onset   • Diabetes Father    • Hypertension Father    • Goiter Mother    • Colon polyps Mother    • Colon cancer Maternal Aunt    • Lung cancer Maternal Uncle    • Thyroid disease Maternal Grandmother    • Malig Hyperthermia Neg Hx        Social History:   reports that she has never smoked. She has never used smokeless tobacco. She reports that she does not drink alcohol and does not use drugs.    Medications:   Medications Prior to Admission   Medication Sig Dispense Refill Last Dose   • amLODIPine (NORVASC) 10 MG tablet Take 1 tablet by mouth Daily. 90 tablet 1 1/17/2023   • Cholecalciferol (VITAMIN D3) 2000 UNITS tablet Take 1 tablet by mouth Daily.   Past Week   • fluticasone (Flonase) 50 MCG/ACT nasal spray 2 sprays into the nostril(s) as directed by provider Daily. 2 puffs each nostril 9.9 mL 0 Past Week   • irbesartan-hydrochlorothiazide (AVALIDE) 300-12.5 MG tablet Take 1 tablet by mouth Daily. 90 tablet 1 1/17/2023   • levothyroxine (SYNTHROID, LEVOTHROID) 137 MCG tablet TAKE ONE TABLET BY MOUTH DAILY 30 tablet 0 1/17/2023   • meloxicam (MOBIC) 15 MG tablet 1 PO Daily with food. 30 tablet 0 Past Month   • metoprolol succinate XL (TOPROL-XL) 100 MG 24 hr tablet Take 1 tablet by mouth Daily. 90 tablet 1 1/17/2023   • B Complex Vitamins (VITAMIN B  "COMPLEX) capsule capsule Take  by mouth Daily.   1/15/2023   • Calcium Acetate, Phos Binder, (CALCIUM ACETATE PO) Take  by mouth.   1/15/2023   • clotrimazole-betamethasone (LOTRISONE) 1-0.05 % cream Apply 1 application topically to the appropriate area as directed 2 (Two) Times a Day. 45 g 0    • COD LIVER OIL PO Take  by mouth.   1/15/2023   • Coenzyme Q10 (CO Q 10 PO) Take  by mouth.   1/15/2023   • guaiFENesin-codeine (GUAIFENESIN AC) 100-10 MG/5ML liquid Take 10 mL by mouth 3 (Three) Times a Day As Needed for Cough. 200 mL 0    • Magnesium 250 MG tablet Take  by mouth.   1/15/2023   • metFORMIN ER (GLUCOPHAGE-XR) 750 MG 24 hr tablet Take 1 tablet by mouth Every Morning Before Breakfast. 90 tablet 1 1/15/2023   • Milk Thistle 250 MG capsule Take  by mouth Daily.   1/15/2023   • Multiple Vitamins-Minerals (MULTIVITAMIN ADULT PO) Take 1 tablet by mouth Daily.   1/15/2023       Allergies:  Contrast dye (echo or unknown ct/mr) and Statins    ROS:    Pertinent items are noted in HPI     Objective     Blood pressure 128/74, pulse 55, resp. rate 18, height 162.6 cm (64\"), weight 80.6 kg (177 lb 12.8 oz), SpO2 99 %, not currently breastfeeding.    Physical Exam   Constitutional: Pt is oriented to person, place, and time and well-developed, well-nourished, and in no distress.   HENT:   Mouth/Throat: Oropharynx is clear and moist.   Neck: Normal range of motion. Neck supple.   Cardiovascular: Normal rate, regular rhythm and normal heart sounds.    Pulmonary/Chest: Effort normal and breath sounds normal. No respiratory distress. No  wheezes.   Abdominal: Soft. Bowel sounds are normal.   Skin: Skin is warm and dry.   Psychiatric: Mood, memory, affect and judgment normal.     Assessment & Plan     Diagnosis:  67 y.o. female who has a family history (maternal aunt) of colon cancer.  In addition her mom had colon polyps.  The patient's last colonoscopy was about 5 years ago.      Anticipated Surgical " Procedure:  Colonoscopy    The risks, benefits, and alternatives of this procedure have been discussed with the patient or the responsible party- the patient understands and agrees to proceed.    Michael Allen M.D.

## 2023-01-19 LAB
LAB AP CASE REPORT: NORMAL
PATH REPORT.FINAL DX SPEC: NORMAL
PATH REPORT.GROSS SPEC: NORMAL

## 2023-01-20 ENCOUNTER — TELEPHONE (OUTPATIENT)
Dept: GASTROENTEROLOGY | Facility: CLINIC | Age: 68
End: 2023-01-20

## 2023-01-20 NOTE — PROGRESS NOTES
01/20/23       Tell her that the colon polyp that was removed was not cancerous and not precancerous, which is good.  I would recommend that she have a repeat colonoscopy in 5 years.  Please send a copy of this report to her PCP.  Lawrence gu

## 2023-04-15 DIAGNOSIS — E11.9 CONTROLLED TYPE 2 DIABETES MELLITUS WITHOUT COMPLICATION, WITHOUT LONG-TERM CURRENT USE OF INSULIN: ICD-10-CM

## 2023-04-15 DIAGNOSIS — I10 BENIGN ESSENTIAL HYPERTENSION: ICD-10-CM

## 2023-04-15 DIAGNOSIS — E03.8 ADULT ONSET HYPOTHYROIDISM: ICD-10-CM

## 2023-04-17 RX ORDER — METOPROLOL SUCCINATE 100 MG/1
TABLET, EXTENDED RELEASE ORAL
Qty: 30 TABLET | Refills: 0 | Status: SHIPPED | OUTPATIENT
Start: 2023-04-17

## 2023-04-17 RX ORDER — LEVOTHYROXINE SODIUM 137 UG/1
TABLET ORAL
Qty: 30 TABLET | Refills: 0 | Status: SHIPPED | OUTPATIENT
Start: 2023-04-17

## 2023-04-17 RX ORDER — AMLODIPINE BESYLATE 10 MG/1
TABLET ORAL
Qty: 30 TABLET | Refills: 0 | Status: SHIPPED | OUTPATIENT
Start: 2023-04-17

## 2023-04-17 RX ORDER — METFORMIN HYDROCHLORIDE 750 MG/1
TABLET, EXTENDED RELEASE ORAL
Qty: 30 TABLET | Refills: 0 | Status: SHIPPED | OUTPATIENT
Start: 2023-04-17

## 2023-04-17 RX ORDER — IRBESARTAN AND HYDROCHLOROTHIAZIDE 300; 12.5 MG/1; MG/1
TABLET, FILM COATED ORAL
Qty: 30 TABLET | Refills: 0 | Status: SHIPPED | OUTPATIENT
Start: 2023-04-17

## 2023-05-23 DIAGNOSIS — I10 BENIGN ESSENTIAL HYPERTENSION: ICD-10-CM

## 2023-05-23 RX ORDER — IRBESARTAN AND HYDROCHLOROTHIAZIDE 300; 12.5 MG/1; MG/1
1 TABLET, FILM COATED ORAL DAILY
Qty: 15 TABLET | Refills: 0 | Status: SHIPPED | OUTPATIENT
Start: 2023-05-23

## 2023-05-23 NOTE — TELEPHONE ENCOUNTER
Refill request sent originally sent thru fax    Last OV 05.09.22  No follow-up scheduled    Changed disp from 30 to 15 with 0 refills. Note to pharmacy.

## 2023-05-24 DIAGNOSIS — I10 BENIGN ESSENTIAL HYPERTENSION: ICD-10-CM

## 2023-05-24 DIAGNOSIS — E11.9 CONTROLLED TYPE 2 DIABETES MELLITUS WITHOUT COMPLICATION, WITHOUT LONG-TERM CURRENT USE OF INSULIN: ICD-10-CM

## 2023-05-24 DIAGNOSIS — E03.8 ADULT ONSET HYPOTHYROIDISM: ICD-10-CM

## 2023-05-24 RX ORDER — AMLODIPINE BESYLATE 10 MG/1
10 TABLET ORAL DAILY
Qty: 15 TABLET | Refills: 0 | Status: SHIPPED | OUTPATIENT
Start: 2023-05-24

## 2023-05-24 RX ORDER — METOPROLOL SUCCINATE 100 MG/1
100 TABLET, EXTENDED RELEASE ORAL DAILY
Qty: 15 TABLET | Refills: 0 | Status: SHIPPED | OUTPATIENT
Start: 2023-05-24

## 2023-05-24 RX ORDER — LEVOTHYROXINE SODIUM 137 UG/1
137 TABLET ORAL DAILY
Qty: 15 TABLET | Refills: 0 | Status: SHIPPED | OUTPATIENT
Start: 2023-05-24

## 2023-05-24 RX ORDER — METFORMIN HYDROCHLORIDE 750 MG/1
750 TABLET, EXTENDED RELEASE ORAL
Qty: 15 TABLET | Refills: 0 | Status: SHIPPED | OUTPATIENT
Start: 2023-05-24

## 2023-05-24 NOTE — TELEPHONE ENCOUNTER
Refills requested through fax    Last visit 05.09.22  Next follow-up scheduled  Last refill 04.17.23    Changed disp from 30 to 15 with 0 refills. Note to pharmacy.

## 2023-06-09 DIAGNOSIS — I10 BENIGN ESSENTIAL HYPERTENSION: ICD-10-CM

## 2023-06-09 RX ORDER — IRBESARTAN AND HYDROCHLOROTHIAZIDE 300; 12.5 MG/1; MG/1
1 TABLET, FILM COATED ORAL DAILY
Qty: 5 TABLET | Refills: 0 | Status: SHIPPED | OUTPATIENT
Start: 2023-06-09

## 2023-06-23 PROBLEM — Z83.719 FAMILY HISTORY OF COLONIC POLYPS: Status: RESOLVED | Noted: 2022-04-27 | Resolved: 2023-06-23

## 2023-06-23 PROBLEM — Z83.71 FAMILY HISTORY OF COLONIC POLYPS: Status: RESOLVED | Noted: 2022-04-27 | Resolved: 2023-06-23

## 2023-09-15 ENCOUNTER — TELEPHONE (OUTPATIENT)
Dept: FAMILY MEDICINE CLINIC | Facility: CLINIC | Age: 68
End: 2023-09-15
Payer: MEDICARE

## 2023-09-15 DIAGNOSIS — E03.8 ADULT ONSET HYPOTHYROIDISM: ICD-10-CM

## 2023-09-15 RX ORDER — LEVOTHYROXINE SODIUM 137 UG/1
137 TABLET ORAL DAILY
Qty: 90 TABLET | Refills: 2 | Status: SHIPPED | OUTPATIENT
Start: 2023-09-15

## 2023-11-20 ENCOUNTER — TELEPHONE (OUTPATIENT)
Dept: FAMILY MEDICINE CLINIC | Facility: CLINIC | Age: 68
End: 2023-11-20

## 2023-11-20 NOTE — TELEPHONE ENCOUNTER
Caller: Micheline Cervantes    Relationship: Self    Best call back number: [phone]     What was the call regarding: PATIENT STATED [facility] WOULD BE SENDING A FAX FOR THE PATIENT.    PATIENT STATED SHE WILL NOW BW USING THIS PHARMACY.

## 2023-12-26 ENCOUNTER — TELEPHONE (OUTPATIENT)
Dept: FAMILY MEDICINE CLINIC | Facility: CLINIC | Age: 68
End: 2023-12-26

## 2023-12-26 NOTE — TELEPHONE ENCOUNTER
Pt called with questions regarding getting her meds through [email or web address]. She has been told that [employer] sent something for dr johnson to fill out in Nov and Dec but nothing has been done yet. Please call pt at [phone]

## 2024-01-08 ENCOUNTER — OFFICE VISIT (OUTPATIENT)
Dept: FAMILY MEDICINE CLINIC | Facility: CLINIC | Age: 69
End: 2024-01-08
Payer: MEDICARE

## 2024-01-08 VITALS
WEIGHT: 184 LBS | BODY MASS INDEX: 31.41 KG/M2 | HEIGHT: 64 IN | RESPIRATION RATE: 18 BRPM | HEART RATE: 69 BPM | SYSTOLIC BLOOD PRESSURE: 138 MMHG | OXYGEN SATURATION: 99 % | DIASTOLIC BLOOD PRESSURE: 80 MMHG

## 2024-01-08 DIAGNOSIS — E11.9 CONTROLLED TYPE 2 DIABETES MELLITUS WITHOUT COMPLICATION, WITHOUT LONG-TERM CURRENT USE OF INSULIN: Primary | ICD-10-CM

## 2024-01-08 DIAGNOSIS — I10 BENIGN ESSENTIAL HYPERTENSION: ICD-10-CM

## 2024-01-08 PROBLEM — M72.2 PLANTAR FASCIITIS: Status: RESOLVED | Noted: 2021-06-17 | Resolved: 2024-01-08

## 2024-01-08 PROCEDURE — 3075F SYST BP GE 130 - 139MM HG: CPT | Performed by: FAMILY MEDICINE

## 2024-01-08 PROCEDURE — 3079F DIAST BP 80-89 MM HG: CPT | Performed by: FAMILY MEDICINE

## 2024-01-08 PROCEDURE — 99214 OFFICE O/P EST MOD 30 MIN: CPT | Performed by: FAMILY MEDICINE

## 2024-01-08 NOTE — PROGRESS NOTES
"Assessment and Plan     Assessment & Plan  Controlled type 2 diabetes mellitus without complication, without long-term current use of insulin  Labs today  Benign essential hypertension  Blood pressure appears borderline controlled here.  I would like to get some further pressures since she has been off the irbesartan hydrochlorothiazide for some time before resuming medication.  She will have to be at least on the low-dose of an ARB.    Orders Placed This Encounter   Procedures    Hemoglobin A1c          No follow-ups on file.  Patient was given instructions and counseling regarding her condition or for health maintenance advice. Please see specific information pulled into the AVS if appropriate.         Micheline is a 68 y.o. being seen today for  Hypertension, Diabetes, Hypothyroidism, and Hyperlipidemia   HISTORY    HPI  No complaints.  Not taking meds as ordered except for her irbesartan HCT.  No headache, chest pain or shortness of breath.  Patient received a card from a  and because it stated that it was Marsha had been associated with some [employer] tensions in the past she could be associated with a class action lawsuit if she did not tell them that she was not interested in doing that.  Unfortunately the only way that the patient would be aware that this was a past issue was the fact that words \"consumed\" as opposed to being in the present was on the postcard with information about the back and the front.  There was nothing that indicated that she should continue to take her medication as ordered and that this was a past problem.  Ms. Cervantes did call into the office when she received the card and was told not to stop taking her medicine but because of the contents of the postcard she felt uncomfortable continuing to take it.  Social History  She  reports that she has never smoked. She has never used smokeless tobacco. She reports that she does not drink alcohol and does not use drugs.  EXAM DATA    Vital " Signs        BP Readings from Last 1 Encounters:   01/08/24 138/80     Wt Readings from Last 3 Encounters:   01/08/24 83.5 kg (184 lb)   06/23/23 79.8 kg (176 lb)   01/17/23 80.6 kg (177 lb 12.8 oz)   Body mass index is 31.58 kg/m².  Physical Exam  Vitals reviewed.   Constitutional:       Appearance: Normal appearance. She is well-developed.   Cardiovascular:      Rate and Rhythm: Normal rate and regular rhythm.      Heart sounds: Normal heart sounds.   Pulmonary:      Effort: Pulmonary effort is normal.      Breath sounds: Normal breath sounds.   Neurological:      Mental Status: She is alert.   Psychiatric:         Behavior: Behavior normal.         Thought Content: Thought content normal.         Judgment: Judgment normal.

## 2024-01-08 NOTE — ASSESSMENT & PLAN NOTE
Blood pressure appears borderline controlled here.  I would like to get some further pressures since she has been off the irbesartan hydrochlorothiazide for some time before resuming medication.  She will have to be at least on the low-dose of an ARB.

## 2024-01-09 DIAGNOSIS — I10 BENIGN ESSENTIAL HYPERTENSION: ICD-10-CM

## 2024-01-09 DIAGNOSIS — E11.9 CONTROLLED TYPE 2 DIABETES MELLITUS WITHOUT COMPLICATION, WITHOUT LONG-TERM CURRENT USE OF INSULIN: ICD-10-CM

## 2024-01-09 LAB — HBA1C MFR BLD: 6.8 % (ref 4.8–5.6)

## 2024-01-09 RX ORDER — METFORMIN HYDROCHLORIDE 750 MG/1
750 TABLET, EXTENDED RELEASE ORAL
Qty: 90 TABLET | Refills: 1 | Status: SHIPPED | OUTPATIENT
Start: 2024-01-09

## 2024-01-09 RX ORDER — METOPROLOL SUCCINATE 100 MG/1
100 TABLET, EXTENDED RELEASE ORAL DAILY
Qty: 90 TABLET | Refills: 1 | Status: SHIPPED | OUTPATIENT
Start: 2024-01-09

## 2024-01-09 RX ORDER — AMLODIPINE BESYLATE 10 MG/1
10 TABLET ORAL DAILY
Qty: 90 TABLET | Refills: 1 | Status: SHIPPED | OUTPATIENT
Start: 2024-01-09

## 2024-03-13 DIAGNOSIS — I10 BENIGN ESSENTIAL HYPERTENSION: ICD-10-CM

## 2024-03-14 RX ORDER — IRBESARTAN AND HYDROCHLOROTHIAZIDE 300; 12.5 MG/1; MG/1
1 TABLET, FILM COATED ORAL DAILY
Qty: 90 TABLET | Refills: 1 | Status: SHIPPED | OUTPATIENT
Start: 2024-03-14

## 2024-06-11 DIAGNOSIS — I10 BENIGN ESSENTIAL HYPERTENSION: ICD-10-CM

## 2024-06-11 RX ORDER — METOPROLOL SUCCINATE 100 MG/1
100 TABLET, EXTENDED RELEASE ORAL DAILY
Qty: 15 TABLET | Refills: 0 | Status: SHIPPED | OUTPATIENT
Start: 2024-06-11

## 2024-06-11 RX ORDER — AMLODIPINE BESYLATE 10 MG/1
10 TABLET ORAL DAILY
Qty: 30 TABLET | Refills: 0 | Status: SHIPPED | OUTPATIENT
Start: 2024-06-11

## 2024-06-15 DIAGNOSIS — E03.8 ADULT ONSET HYPOTHYROIDISM: ICD-10-CM

## 2024-06-17 RX ORDER — LEVOTHYROXINE SODIUM 137 UG/1
137 TABLET ORAL DAILY
Qty: 90 TABLET | Refills: 2 | Status: SHIPPED | OUTPATIENT
Start: 2024-06-17

## 2024-07-01 DIAGNOSIS — I10 BENIGN ESSENTIAL HYPERTENSION: ICD-10-CM

## 2024-07-01 RX ORDER — METOPROLOL SUCCINATE 100 MG/1
100 TABLET, EXTENDED RELEASE ORAL DAILY
Qty: 15 TABLET | Refills: 0 | OUTPATIENT
Start: 2024-07-01

## 2024-08-15 DIAGNOSIS — I10 BENIGN ESSENTIAL HYPERTENSION: ICD-10-CM

## 2024-08-16 RX ORDER — IRBESARTAN AND HYDROCHLOROTHIAZIDE 300; 12.5 MG/1; MG/1
1 TABLET, FILM COATED ORAL DAILY
Qty: 5 TABLET | Refills: 0 | Status: SHIPPED | OUTPATIENT
Start: 2024-08-16

## 2024-08-16 RX ORDER — METOPROLOL SUCCINATE 100 MG/1
100 TABLET, EXTENDED RELEASE ORAL DAILY
Qty: 5 TABLET | Refills: 0 | Status: SHIPPED | OUTPATIENT
Start: 2024-08-16

## 2024-08-23 DIAGNOSIS — I10 BENIGN ESSENTIAL HYPERTENSION: ICD-10-CM

## 2024-08-23 RX ORDER — METOPROLOL SUCCINATE 100 MG/1
100 TABLET, EXTENDED RELEASE ORAL DAILY
Qty: 15 TABLET | Refills: 0 | Status: SHIPPED | OUTPATIENT
Start: 2024-08-23

## 2024-09-11 DIAGNOSIS — I10 BENIGN ESSENTIAL HYPERTENSION: ICD-10-CM

## 2024-09-11 RX ORDER — AMLODIPINE BESYLATE 10 MG/1
10 TABLET ORAL DAILY
Qty: 90 TABLET | Refills: 0 | Status: SHIPPED | OUTPATIENT
Start: 2024-09-11

## 2024-09-13 ENCOUNTER — OFFICE VISIT (OUTPATIENT)
Dept: FAMILY MEDICINE CLINIC | Facility: CLINIC | Age: 69
End: 2024-09-13
Payer: MEDICARE

## 2024-09-13 VITALS
WEIGHT: 178 LBS | RESPIRATION RATE: 18 BRPM | HEART RATE: 61 BPM | OXYGEN SATURATION: 96 % | SYSTOLIC BLOOD PRESSURE: 124 MMHG | HEIGHT: 64 IN | BODY MASS INDEX: 30.39 KG/M2 | DIASTOLIC BLOOD PRESSURE: 82 MMHG

## 2024-09-13 DIAGNOSIS — E11.9 CONTROLLED TYPE 2 DIABETES MELLITUS WITHOUT COMPLICATION, WITHOUT LONG-TERM CURRENT USE OF INSULIN: ICD-10-CM

## 2024-09-13 DIAGNOSIS — R94.31 ABNORMAL ELECTROCARDIOGRAM (ECG) (EKG): ICD-10-CM

## 2024-09-13 DIAGNOSIS — E78.2 MIXED HYPERLIPIDEMIA: ICD-10-CM

## 2024-09-13 DIAGNOSIS — I10 BENIGN ESSENTIAL HYPERTENSION: Primary | ICD-10-CM

## 2024-09-13 DIAGNOSIS — K21.00 GASTROESOPHAGEAL REFLUX DISEASE WITH ESOPHAGITIS WITHOUT HEMORRHAGE: ICD-10-CM

## 2024-09-13 DIAGNOSIS — I49.9 IRREGULAR HEART RATE: ICD-10-CM

## 2024-09-13 DIAGNOSIS — E03.9 ACQUIRED HYPOTHYROIDISM: ICD-10-CM

## 2024-09-13 NOTE — ASSESSMENT & PLAN NOTE
BP is controlled well. She will recheck in six months. She will continue present meds. Prescription will be sent when notified by pharmacy..
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
## 2024-09-13 NOTE — PATIENT INSTRUCTIONS
Advance Care Planning and Advance Directives     You make decisions on a daily basis - decisions about where you want to live, your career, your home, your life. Perhaps one of the most important decisions you face is your choice for future medical care. Take time to talk with your family and your healthcare team and start planning today.  Advance Care Planning is a process that can help you:  Understand possible future healthcare decisions in light of your own experiences  Reflect on those decision in light of your goals and values  Discuss your decisions with those closest to you and the healthcare professionals that care for you  Make a plan by creating a document that reflects your wishes    Surrogate Decision Maker  In the event of a medical emergency, which has left you unable to communicate or to make your own decisions, you would need someone to make decisions for you.  It is important to discuss your preferences for medical treatment with this person while you are in good health.     Qualities of a surrogate decision maker:  Willing to take on this role and responsibility  Knows what you want for future medical care  Willing to follow your wishes even if they don't agree with them  Able to make difficult medical decisions under stressful circumstances    Advance Directives  These are legal documents you can create that will guide your healthcare team and decision maker(s) when needed. These documents can be stored in the electronic medical record.    Living Will - a legal document to guide your care if you have a terminal condition or a serious illness and are unable to communicate. States vary by statute in document names/types, but most forms may include one or more of the following:        -  Directions regarding life-prolonging treatments        -  Directions regarding artificially provided nutrition/hydration        -  Choosing a healthcare decision maker        -  Direction regarding organ/tissue  donation    Durable Power of  for Healthcare - this document names an -in-fact to make medical decisions for you, but it may also allow this person to make personal and financial decisions for you. Please seek the advice of an  if you need this type of document.    **Advance Directives are not required and no one may discriminate against you if you do not sign one.    Medical Orders  Many states allow specific forms/orders signed by your physician to record your wishes for medical treatment in your current state of health. This form, signed in personal communication with your physician, addresses resuscitation and other medical interventions that you may or may not want.      For more information or to schedule a time with a [facility] Advance Care Planning Facilitator contact: [facility]/ACP or call [phone] and someone will contact you directly.  Advance Care Planning and Advance Directives     You make decisions on a daily basis - decisions about where you want to live, your career, your home, your life. Perhaps one of the most important decisions you face is your choice for future medical care. Take time to talk with your family and your healthcare team and start planning today.  Advance Care Planning is a process that can help you:  Understand possible future healthcare decisions in light of your own experiences  Reflect on those decision in light of your goals and values  Discuss your decisions with those closest to you and the healthcare professionals that care for you  Make a plan by creating a document that reflects your wishes    Surrogate Decision Maker  In the event of a medical emergency, which has left you unable to communicate or to make your own decisions, you would need someone to make decisions for you.  It is important to discuss your preferences for medical treatment with this person while you are in good health.     Qualities of a surrogate decision  maker:  Willing to take on this role and responsibility  Knows what you want for future medical care  Willing to follow your wishes even if they don't agree with them  Able to make difficult medical decisions under stressful circumstances    Advance Directives  These are legal documents you can create that will guide your healthcare team and decision maker(s) when needed. These documents can be stored in the electronic medical record.    Living Will - a legal document to guide your care if you have a terminal condition or a serious illness and are unable to communicate. States vary by statute in document names/types, but most forms may include one or more of the following:        -  Directions regarding life-prolonging treatments        -  Directions regarding artificially provided nutrition/hydration        -  Choosing a healthcare decision maker        -  Direction regarding organ/tissue donation    Durable Power of  for Healthcare - this document names an -in-fact to make medical decisions for you, but it may also allow this person to make personal and financial decisions for you. Please seek the advice of an  if you need this type of document.    **Advance Directives are not required and no one may discriminate against you if you do not sign one.    Medical Orders  Many states allow specific forms/orders signed by your physician to record your wishes for medical treatment in your current state of health. This form, signed in personal communication with your physician, addresses resuscitation and other medical interventions that you may or may not want.      For more information or to schedule a time with a [facility] Advance Care Planning Facilitator contact: [facility].[employer]/ACP or call [phone] and someone will contact you directly.

## 2024-09-16 ENCOUNTER — TELEPHONE (OUTPATIENT)
Dept: FAMILY MEDICINE CLINIC | Facility: CLINIC | Age: 69
End: 2024-09-16
Payer: MEDICARE

## 2024-10-07 ENCOUNTER — TELEPHONE (OUTPATIENT)
Dept: CARDIOLOGY | Facility: CLINIC | Age: 69
End: 2024-10-07
Payer: MEDICARE

## 2024-10-08 ENCOUNTER — HOSPITAL ENCOUNTER (OUTPATIENT)
Dept: CARDIOLOGY | Facility: HOSPITAL | Age: 69
Discharge: HOME | End: 2024-10-08
Payer: MEDICARE

## 2024-10-08 ENCOUNTER — TELEPHONE (OUTPATIENT)
Dept: FAMILY MEDICINE CLINIC | Facility: CLINIC | Age: 69
End: 2024-10-08
Payer: MEDICARE

## 2024-10-08 VITALS
SYSTOLIC BLOOD PRESSURE: 118 MMHG | BODY MASS INDEX: 30.39 KG/M2 | HEART RATE: 48 BPM | HEIGHT: 64 IN | WEIGHT: 178 LBS | DIASTOLIC BLOOD PRESSURE: 80 MMHG

## 2024-10-08 DIAGNOSIS — I49.9 IRREGULAR HEART RATE: ICD-10-CM

## 2024-10-08 DIAGNOSIS — R94.31 ABNORMAL ELECTROCARDIOGRAM (ECG) (EKG): ICD-10-CM

## 2024-10-08 LAB
AORTIC DIMENSIONLESS INDEX: 0.7 (DI)
ASCENDING AORTA: 3.4 CM
BH CV ECHO MEAS - ACS: 2 CM
BH CV ECHO MEAS - AO MAX PG: 11.8 MMHG
BH CV ECHO MEAS - AO MEAN PG: 5.5 MMHG
BH CV ECHO MEAS - AO ROOT AREA (BSA CORRECTED): 1.8 CM2
BH CV ECHO MEAS - AO ROOT DIAM: 3 CM
BH CV ECHO MEAS - AO V2 MAX: 171.8 CM/SEC
BH CV ECHO MEAS - AO V2 VTI: 37.8 CM
BH CV ECHO MEAS - AVA(I,D): 2.12 CM2
BH CV ECHO MEAS - EDV(CUBED): 87.9 ML
BH CV ECHO MEAS - EDV(MOD-SP2): 84 ML
BH CV ECHO MEAS - EDV(MOD-SP4): 69 ML
BH CV ECHO MEAS - EF(MOD-BP): 63.2 %
BH CV ECHO MEAS - EF(MOD-SP2): 67.9 %
BH CV ECHO MEAS - EF(MOD-SP4): 56.5 %
BH CV ECHO MEAS - ESV(CUBED): 32.5 ML
BH CV ECHO MEAS - ESV(MOD-SP2): 27 ML
BH CV ECHO MEAS - ESV(MOD-SP4): 30 ML
BH CV ECHO MEAS - FS: 28.3 %
BH CV ECHO MEAS - IVS/LVPW: 0.9 CM
BH CV ECHO MEAS - IVSD: 0.87 CM
BH CV ECHO MEAS - LAT PEAK E' VEL: 11.3 CM/SEC
BH CV ECHO MEAS - LV DIASTOLIC VOL/BSA (35-75): 37.1 CM2
BH CV ECHO MEAS - LV MASS(C)D: 133.3 GRAMS
BH CV ECHO MEAS - LV MAX PG: 5.6 MMHG
BH CV ECHO MEAS - LV MEAN PG: 2.7 MMHG
BH CV ECHO MEAS - LV SYSTOLIC VOL/BSA (12-30): 16.1 CM2
BH CV ECHO MEAS - LV V1 MAX: 118.4 CM/SEC
BH CV ECHO MEAS - LV V1 VTI: 26.7 CM
BH CV ECHO MEAS - LVIDD: 4.4 CM
BH CV ECHO MEAS - LVIDS: 3.2 CM
BH CV ECHO MEAS - LVOT AREA: 3 CM2
BH CV ECHO MEAS - LVOT DIAM: 1.95 CM
BH CV ECHO MEAS - LVPWD: 0.96 CM
BH CV ECHO MEAS - MED PEAK E' VEL: 6.4 CM/SEC
BH CV ECHO MEAS - MR MAX PG: 81.6 MMHG
BH CV ECHO MEAS - MR MAX VEL: 451.7 CM/SEC
BH CV ECHO MEAS - MV A DUR: 0.12 SEC
BH CV ECHO MEAS - MV A MAX VEL: 72.8 CM/SEC
BH CV ECHO MEAS - MV DEC SLOPE: 561.8 CM/SEC2
BH CV ECHO MEAS - MV DEC TIME: 0.18 SEC
BH CV ECHO MEAS - MV E MAX VEL: 77.6 CM/SEC
BH CV ECHO MEAS - MV E/A: 1.07
BH CV ECHO MEAS - MV MAX PG: 2.9 MMHG
BH CV ECHO MEAS - MV MEAN PG: 0.88 MMHG
BH CV ECHO MEAS - MV P1/2T: 44.4 MSEC
BH CV ECHO MEAS - MV V2 VTI: 31.1 CM
BH CV ECHO MEAS - MVA(P1/2T): 5 CM2
BH CV ECHO MEAS - MVA(VTI): 2.6 CM2
BH CV ECHO MEAS - PA V2 MAX: 77.9 CM/SEC
BH CV ECHO MEAS - PULM A REVS DUR: 0.11 SEC
BH CV ECHO MEAS - PULM A REVS VEL: 21.9 CM/SEC
BH CV ECHO MEAS - PULM DIAS VEL: 38.3 CM/SEC
BH CV ECHO MEAS - PULM S/D: 1.39
BH CV ECHO MEAS - PULM SYS VEL: 53.4 CM/SEC
BH CV ECHO MEAS - QP/QS: 0.53
BH CV ECHO MEAS - RAP SYSTOLE: 3 MMHG
BH CV ECHO MEAS - RV MAX PG: 1.28 MMHG
BH CV ECHO MEAS - RV V1 MAX: 56.6 CM/SEC
BH CV ECHO MEAS - RV V1 VTI: 13.5 CM
BH CV ECHO MEAS - RVOT DIAM: 2.01 CM
BH CV ECHO MEAS - RVSP: 27 MMHG
BH CV ECHO MEAS - SV(LVOT): 80.1 ML
BH CV ECHO MEAS - SV(MOD-SP2): 57 ML
BH CV ECHO MEAS - SV(MOD-SP4): 39 ML
BH CV ECHO MEAS - SV(RVOT): 42.6 ML
BH CV ECHO MEAS - SVI(LVOT): 43 ML/M2
BH CV ECHO MEAS - SVI(MOD-SP2): 30.6 ML/M2
BH CV ECHO MEAS - SVI(MOD-SP4): 21 ML/M2
BH CV ECHO MEAS - TAPSE (>1.6): 1.82 CM
BH CV ECHO MEAS - TR MAX PG: 24.2 MMHG
BH CV ECHO MEAS - TR MAX VEL: 246 CM/SEC
BH CV ECHO MEASUREMENTS AVERAGE E/E' RATIO: 8.77
BH CV STRESS BP STAGE 1: NORMAL
BH CV STRESS DURATION MIN STAGE 1: 3
BH CV STRESS DURATION MIN STAGE 2: 1
BH CV STRESS DURATION SEC STAGE 1: 0
BH CV STRESS DURATION SEC STAGE 2: 20
BH CV STRESS GRADE STAGE 1: 10
BH CV STRESS GRADE STAGE 2: 12
BH CV STRESS HR STAGE 1: 98
BH CV STRESS HR STAGE 2: 109
BH CV STRESS METS STAGE 1: 5
BH CV STRESS METS STAGE 2: 6
BH CV STRESS PROTOCOL 1: NORMAL
BH CV STRESS RECOVERY BP: NORMAL MMHG
BH CV STRESS RECOVERY HR: 61 BPM
BH CV STRESS SPEED STAGE 1: 1.7
BH CV STRESS SPEED STAGE 2: 2.5
BH CV STRESS STAGE 1: 1
BH CV STRESS STAGE 2: 2
BH CV XLRA - RV BASE: 3.8 CM
BH CV XLRA - RV LENGTH: 6.8 CM
BH CV XLRA - RV MID: 2.7 CM
BH CV XLRA - TDI S': 11.3 CM/SEC
LEFT ATRIUM VOLUME INDEX: 28 ML/M2
MAXIMAL PREDICTED HEART RATE: 151 BPM
PERCENT MAX PREDICTED HR: 72.19 %
SINUS: 2.7 CM
STJ: 2.7 CM
STRESS BASELINE BP: NORMAL MMHG
STRESS BASELINE HR: 66 BPM
STRESS PERCENT HR: 85 %
STRESS POST ESTIMATED WORKLOAD: 6 METS
STRESS POST EXERCISE DUR MIN: 4 MIN
STRESS POST EXERCISE DUR SEC: 20 SEC
STRESS POST PEAK BP: NORMAL MMHG
STRESS POST PEAK HR: 109 BPM
STRESS TARGET HR: 128 BPM

## 2024-10-08 PROCEDURE — 93018 CV STRESS TEST I&R ONLY: CPT | Performed by: INTERNAL MEDICINE

## 2024-10-08 PROCEDURE — 93017 CV STRESS TEST TRACING ONLY: CPT

## 2024-10-08 PROCEDURE — 93016 CV STRESS TEST SUPVJ ONLY: CPT | Performed by: INTERNAL MEDICINE

## 2024-10-08 PROCEDURE — 93306 TTE W/DOPPLER COMPLETE: CPT

## 2024-10-08 NOTE — TELEPHONE ENCOUNTER
LVM for pt to return call regarding low heart rate results of stress test. Will send pt mychart message

## 2024-10-09 DIAGNOSIS — R00.1 BRADYCARDIA: ICD-10-CM

## 2024-10-09 DIAGNOSIS — I49.3 FREQUENT PVCS: ICD-10-CM

## 2024-10-09 DIAGNOSIS — I10 BENIGN ESSENTIAL HYPERTENSION: ICD-10-CM

## 2024-10-09 DIAGNOSIS — R94.39 EQUIVOCAL STRESS TEST: ICD-10-CM

## 2024-10-09 DIAGNOSIS — E11.9 CONTROLLED TYPE 2 DIABETES MELLITUS WITHOUT COMPLICATION, WITHOUT LONG-TERM CURRENT USE OF INSULIN: Primary | ICD-10-CM

## 2024-10-09 RX ORDER — METOPROLOL TARTRATE 50 MG
50 TABLET ORAL 2 TIMES DAILY
Qty: 90 TABLET | Refills: 1 | Status: SHIPPED | OUTPATIENT
Start: 2024-10-09

## 2024-10-19 LAB
ALBUMIN SERPL-MCNC: 4.2 G/DL (ref 3.5–5.2)
ALBUMIN/GLOB SERPL: 1.7 G/DL
ALP SERPL-CCNC: 119 U/L (ref 39–117)
ALT SERPL-CCNC: 22 U/L (ref 1–33)
AST SERPL-CCNC: 21 U/L (ref 1–32)
BILIRUB SERPL-MCNC: 0.5 MG/DL (ref 0–1.2)
BUN SERPL-MCNC: 15 MG/DL (ref 8–23)
BUN/CREAT SERPL: 23.1 (ref 7–25)
CALCIUM SERPL-MCNC: 9.9 MG/DL (ref 8.6–10.5)
CHLORIDE SERPL-SCNC: 106 MMOL/L (ref 98–107)
CHOLEST SERPL-MCNC: 211 MG/DL (ref 0–200)
CO2 SERPL-SCNC: 28.7 MMOL/L (ref 22–29)
CREAT SERPL-MCNC: 0.65 MG/DL (ref 0.57–1)
EGFRCR SERPLBLD CKD-EPI 2021: 95.4 ML/MIN/1.73
ERYTHROCYTE [DISTWIDTH] IN BLOOD BY AUTOMATED COUNT: 12 % (ref 12.3–15.4)
GLOBULIN SER CALC-MCNC: 2.5 GM/DL
GLUCOSE SERPL-MCNC: 118 MG/DL (ref 65–99)
HBA1C MFR BLD: 6.6 % (ref 4.8–5.6)
HCT VFR BLD AUTO: 36.5 % (ref 34–46.6)
HDLC SERPL-MCNC: 58 MG/DL (ref 40–60)
HGB BLD-MCNC: 12.6 G/DL (ref 12–15.9)
LDLC SERPL CALC-MCNC: 132 MG/DL (ref 0–100)
LDLC/HDLC SERPL: 2.22 {RATIO}
MCH RBC QN AUTO: 34 PG (ref 26.6–33)
MCHC RBC AUTO-ENTMCNC: 34.5 G/DL (ref 31.5–35.7)
MCV RBC AUTO: 98.4 FL (ref 79–97)
PLATELET # BLD AUTO: 365 10*3/MM3 (ref 140–450)
POTASSIUM SERPL-SCNC: 4.2 MMOL/L (ref 3.5–5.2)
PROT SERPL-MCNC: 6.7 G/DL (ref 6–8.5)
RBC # BLD AUTO: 3.71 10*6/MM3 (ref 3.77–5.28)
SODIUM SERPL-SCNC: 144 MMOL/L (ref 136–145)
TRIGL SERPL-MCNC: 120 MG/DL (ref 0–150)
TSH SERPL DL<=0.005 MIU/L-ACNC: 3.29 UIU/ML (ref 0.27–4.2)
VLDLC SERPL CALC-MCNC: 21 MG/DL (ref 5–40)
WBC # BLD AUTO: 5.74 10*3/MM3 (ref 3.4–10.8)

## 2024-11-08 ENCOUNTER — OFFICE VISIT (OUTPATIENT)
Dept: CARDIOLOGY | Facility: CLINIC | Age: 69
End: 2024-11-08
Payer: MEDICARE

## 2024-11-08 VITALS
DIASTOLIC BLOOD PRESSURE: 81 MMHG | HEIGHT: 64 IN | SYSTOLIC BLOOD PRESSURE: 131 MMHG | HEART RATE: 66 BPM | WEIGHT: 176 LBS | OXYGEN SATURATION: 97 % | BODY MASS INDEX: 30.05 KG/M2

## 2024-11-08 DIAGNOSIS — E78.2 MIXED HYPERLIPIDEMIA: ICD-10-CM

## 2024-11-08 DIAGNOSIS — I10 BENIGN ESSENTIAL HYPERTENSION: Primary | ICD-10-CM

## 2024-11-08 DIAGNOSIS — I34.0 MILD MITRAL VALVE REGURGITATION: ICD-10-CM

## 2024-11-08 DIAGNOSIS — I49.3 PVC'S (PREMATURE VENTRICULAR CONTRACTIONS): ICD-10-CM

## 2024-11-08 PROBLEM — Z78.9 STATIN INTOLERANCE: Status: ACTIVE | Noted: 2024-11-08

## 2024-11-08 PROCEDURE — 3075F SYST BP GE 130 - 139MM HG: CPT | Performed by: INTERNAL MEDICINE

## 2024-11-08 PROCEDURE — 3079F DIAST BP 80-89 MM HG: CPT | Performed by: INTERNAL MEDICINE

## 2024-11-08 PROCEDURE — 99203 OFFICE O/P NEW LOW 30 MIN: CPT | Performed by: INTERNAL MEDICINE

## 2024-11-08 NOTE — PROGRESS NOTES
PATIENTINFORMATION    Date of Office Visit: 2024  Encounter Provider: Alok Mariano MD  Place of Service: [facility] CARDIOLOGY  Patient Name: Micheline Cervantes  : 1955    Subjective:     Encounter Date:2024      Patient ID: Micheline Cervantes is a 69 y.o. female.    Chief Complaint   Patient presents with    Slow Heart Rate       HPI  Ms. Cervantes is a pleasant 69 years old lady who came to cardiology clinic for evaluation of PVCs noted during office visit with Dr. Kennedy.  She is a fairly active lady who walks regularly without exertional chest pain, shortness of breath.  She denies ever having palpitations, presyncope syncope or extremity swelling.  She has longstanding hypertension and diabetes fairly controlled on current regimen.  She has history of severe statin intolerance.  No family history of premature coronary artery disease.  She recently had treadmill test and echocardiogram which were unremarkable.      ROS  All systems reviewed and negative except as noted in HPI.    Past Medical History:   Diagnosis Date    Abdominal pain     Asthma     Diabetes mellitus     GERD (gastroesophageal reflux disease)     Hypertension     Hypothyroidism     Kidney stones     Plantar fasciitis     Sleep apnea with use of continuous positive airway pressure (CPAP)        Past Surgical History:   Procedure Laterality Date    COLONOSCOPY  approx 2016    repeat 5 years -per patient-normal    COLONOSCOPY N/A 2023    Procedure: COLONOSCOPY to cecum with polypectomy (bx);  Surgeon: Michael Allen MD;  Location: [facility] ENDOSCOPY;  Service: Gastroenterology;  Laterality: N/A;  pre - fm hx cc, polyps  post - diverticulosis, polyp, hemorrhoids      ENDOSCOPY N/A 2018    Procedure: ESOPHAGOGASTRODUODENOSCOPY with cold biopsies;  Surgeon: Zev Ludwig MD;  Location: [facility] ENDOSCOPY;  Service: Gastroenterology    HYSTERECTOMY      In the     LUNG SURGERY      Had a blockage  "around 2002 -- 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)    THYROIDECTOMY      in the 80s       Social History     Socioeconomic History    Marital status: Single   Tobacco Use    Smoking status: Never    Smokeless tobacco: Never   Vaping Use    Vaping status: Never Used   Substance and Sexual Activity    Alcohol use: No    Drug use: No    Sexual activity: Defer       Family History   Problem Relation Age of Onset    Diabetes Father     Hypertension Father     Goiter Mother     Colon polyps Mother     Colon cancer Maternal Aunt     Lung cancer Maternal Uncle     Thyroid disease Maternal Grandmother     Malig Hyperthermia Neg Hx          Procedures       Objective:     /81   Pulse 66   Ht 162.6 cm (64\")   Wt 79.8 kg (176 lb)   LMP  (LMP Unknown)   SpO2 97%   BMI 30.21 kg/m²  Body mass index is 30.21 kg/m².     Constitutional:       General: Not in acute distress.     Appearance: Well-developed. Not diaphoretic.   Eyes:      Pupils: Pupils are equal, round, and reactive to light.   HENT:      Head: Normocephalic and atraumatic.   Neck:      Thyroid: No thyromegaly.   Pulmonary:      Effort: Pulmonary effort is normal. No respiratory distress.      Breath sounds: Normal breath sounds. No wheezing. No rales.   Chest:      Chest wall: Not tender to palpatation.   Cardiovascular:      Normal rate. Regular rhythm.      No gallop.    Pulses:     Intact distal pulses.   Edema:     Peripheral edema absent.   Abdominal:      General: Bowel sounds are normal. There is no distension.      Palpations: Abdomen is soft.      Tenderness: There is no guarding.   Musculoskeletal: Normal range of motion.         General: No deformity.      Cervical back: Normal range of motion and neck supple. Skin:     General: Skin is warm and dry.      Findings: No rash.   Neurological:      Mental Status: Alert and oriented to person, place, and time.      Cranial Nerves: No cranial nerve deficit.      Deep Tendon Reflexes: " Reflexes are normal and symmetric.   Psychiatric:         Judgment: Judgment normal.         Review Of Data: I have reviewed pertinent recent labs, images and documents and pertinent findings included in HPI or assessment below.    Lipid Panel          10/18/2024    11:00   Lipid Panel   Total Cholesterol 211    Triglycerides 120    HDL Cholesterol 58    VLDL Cholesterol 21    LDL Cholesterol  132    LDL/HDL Ratio 2.22          Assessment/Plan:           Benign essential hypertension    Mixed hyperlipidemia    PVC's (premature ventricular contractions)    Mild mitral valve regurgitation      Normal cardiovascular examination.  Well-controlled hypertension.  Noted to have frequent PVCs on resting EKG that resolved during exercise.  Otherwise stress test was negative for myocardial ischemia.  Unremarkable echo.  Recommend starting treatment for hypercholesterolemia with nonstatin regimen that I would defer to Dr. Kennedy otherwise continue current medications.  Encouraged her to increase duration of walking/exercise.  Follow-up with cardiac clinic as needed.    Diagnosis and plan of care discussed with patient and verbalized understanding.            Your medication list            Accurate as of November 8, 2024  2:09 PM. If you have any questions, ask your nurse or doctor.                CONTINUE taking these medications        Instructions Last Dose Given Next Dose Due   amLODIPine 10 MG tablet  Commonly known as: NORVASC      TAKE 1 TABLET BY MOUTH DAILY       cetirizine 10 MG tablet  Commonly known as: zyrTEC      Take 1 tablet by mouth Daily.       CO Q 10 PO      Take  by mouth.       irbesartan-hydrochlorothiazide 300-12.5 MG tablet  Commonly known as: AVALIDE      TAKE 1 TABLET BY MOUTH DAILY       levothyroxine 137 MCG tablet  Commonly known as: SYNTHROID, LEVOTHROID      TAKE 1 TABLET BY MOUTH DAILY       Magnesium 250 MG tablet      Take  by mouth.       meloxicam 15 MG tablet  Commonly known as: MOBIC       1 PO Daily with food.       metFORMIN  MG 24 hr tablet  Commonly known as: GLUCOPHAGE-XR      TAKE ONE TABLET BY MOUTH EVERY MORNING BEFORE BREAKFAST       metoprolol tartrate 50 MG tablet  Commonly known as: LOPRESSOR      Take 1 tablet by mouth 2 (Two) Times a Day.       Milk Thistle 250 MG capsule      Take  by mouth Daily.       multivitamin with minerals tablet tablet      Take 1 tablet by mouth Daily.       Vitamin D3 50 MCG (2000 UT) tablet      Take 1 tablet by mouth Daily.                    Alok Mariano MD  11/08/24  14:09 EST

## 2024-12-13 ENCOUNTER — PRIOR AUTHORIZATION (OUTPATIENT)
Dept: FAMILY MEDICINE CLINIC | Facility: CLINIC | Age: 69
End: 2024-12-13
Payer: MEDICARE

## 2024-12-13 DIAGNOSIS — E11.9 CONTROLLED TYPE 2 DIABETES MELLITUS WITHOUT COMPLICATION, WITHOUT LONG-TERM CURRENT USE OF INSULIN: ICD-10-CM

## 2024-12-13 DIAGNOSIS — I10 BENIGN ESSENTIAL HYPERTENSION: ICD-10-CM

## 2024-12-13 RX ORDER — METFORMIN HYDROCHLORIDE 750 MG/1
750 TABLET, EXTENDED RELEASE ORAL
Qty: 90 TABLET | Refills: 1 | Status: SHIPPED | OUTPATIENT
Start: 2024-12-13

## 2024-12-13 RX ORDER — AMLODIPINE BESYLATE 10 MG/1
10 TABLET ORAL DAILY
Qty: 90 TABLET | Refills: 1 | Status: SHIPPED | OUTPATIENT
Start: 2024-12-13

## 2024-12-13 NOTE — TELEPHONE ENCOUNTER
PA response received: The pharmacy has received a paid claim for the submitted date of service. Please review and if needed, resubmit after 7 days.     PA closed.

## 2024-12-13 NOTE — TELEPHONE ENCOUNTER
LAST REFILL - 09/11/24  LAST VISIT - 09/13/24  LAST LABS - 10/18/24  NEXT VISIT - not scheduled    Metformin refill met protocol for MA to sign, approved by MA. Please advise if amlodipine refill is appropriate.

## 2025-01-23 RX ORDER — METOPROLOL TARTRATE 50 MG
50 TABLET ORAL 2 TIMES DAILY
Qty: 90 TABLET | Refills: 0 | Status: SHIPPED | OUTPATIENT
Start: 2025-01-23

## 2025-02-14 ENCOUNTER — OFFICE VISIT (OUTPATIENT)
Dept: FAMILY MEDICINE CLINIC | Facility: CLINIC | Age: 70
End: 2025-02-14
Payer: MEDICARE

## 2025-02-14 VITALS
DIASTOLIC BLOOD PRESSURE: 80 MMHG | HEIGHT: 64 IN | SYSTOLIC BLOOD PRESSURE: 138 MMHG | BODY MASS INDEX: 28.85 KG/M2 | HEART RATE: 52 BPM | RESPIRATION RATE: 18 BRPM | OXYGEN SATURATION: 98 % | WEIGHT: 169 LBS

## 2025-02-14 DIAGNOSIS — E03.8 ADULT ONSET HYPOTHYROIDISM: ICD-10-CM

## 2025-02-14 DIAGNOSIS — E11.9 CONTROLLED TYPE 2 DIABETES MELLITUS WITHOUT COMPLICATION, WITHOUT LONG-TERM CURRENT USE OF INSULIN: ICD-10-CM

## 2025-02-14 DIAGNOSIS — E78.2 MIXED HYPERLIPIDEMIA: ICD-10-CM

## 2025-02-14 DIAGNOSIS — I10 BENIGN ESSENTIAL HYPERTENSION: Primary | ICD-10-CM

## 2025-02-14 DIAGNOSIS — R05.8 POST-VIRAL COUGH SYNDROME: ICD-10-CM

## 2025-02-14 RX ORDER — METHYLPREDNISOLONE 4 MG/1
TABLET ORAL
Qty: 21 EACH | Refills: 0 | Status: SHIPPED | OUTPATIENT
Start: 2025-02-14 | End: 2025-02-19

## 2025-02-14 RX ORDER — IRBESARTAN AND HYDROCHLOROTHIAZIDE 300; 12.5 MG/1; MG/1
1 TABLET, FILM COATED ORAL DAILY
Qty: 90 TABLET | Refills: 1 | Status: SHIPPED | OUTPATIENT
Start: 2025-02-14

## 2025-02-14 NOTE — ASSESSMENT & PLAN NOTE
BP is controlled well. She will recheck in six months. She will continue present meds. Prescription is sent today. .     Orders:    irbesartan-hydrochlorothiazide (AVALIDE) 300-12.5 MG tablet; Take 1 tablet by mouth Daily.

## 2025-02-14 NOTE — PROGRESS NOTES
Assessment & Plan  Benign essential hypertension  BP is controlled well. She will recheck in six months. She will continue present meds. Prescription is sent today. .     Orders:    irbesartan-hydrochlorothiazide (AVALIDE) 300-12.5 MG tablet; Take 1 tablet by mouth Daily.    Post-viral cough syndrome  If this does not resolves needs further workup  Orders:    methylPREDNISolone (MEDROL) 4 MG dose pack; Take as directed on package instructions.    Controlled type 2 diabetes mellitus without complication, without long-term current use of insulin  Labs good in October         Mixed hyperlipidemia   Labs good in October. Cannot take a statin         Adult onset hypothyroidism  TSH normal in October             Patient was given instructions and counseling regarding her condition or for health maintenance advice. Please see specific information pulled into the AVS if appropriate.          Micheline is a 70 y.o. being seen today for  Hypothyroidism, Hyperlipidemia, Hypertension, and Diabetes   HISTORY    HPI   Patient has been out of her BP medications for some time but doing OK. No complaints today except for her cough that has persisted for more than 6 weeks since she had a URI in late December. Has a history of a chronic cough.    Social History  She  reports that she has never smoked. She has never used smokeless tobacco. She reports that she does not drink alcohol and does not use drugs.  EXAM DATA    Vital Signs        BP Readings from Last 1 Encounters:   02/14/25 138/80     Wt Readings from Last 3 Encounters:   02/14/25 76.7 kg (169 lb)   12/28/24 76.7 kg (169 lb)   11/08/24 79.8 kg (176 lb)   Body mass index is 29.01 kg/m².  Physical Exam  Vitals reviewed.   Constitutional:       Appearance: Normal appearance. She is well-developed.   Cardiovascular:      Rate and Rhythm: Normal rate and regular rhythm.      Heart sounds: Normal heart sounds.   Pulmonary:      Effort: Pulmonary effort is normal.      Breath  sounds: Normal breath sounds.   Neurological:      Mental Status: She is alert.   Psychiatric:         Behavior: Behavior normal.         Thought Content: Thought content normal.         Judgment: Judgment normal.               Patient or patient representative verbalized consent for the use of Ambient Listening during the visit with  Mariela Blum MD for chart documentation. 2/14/2025  15:14 EST

## 2025-03-05 RX ORDER — METOPROLOL TARTRATE 50 MG
50 TABLET ORAL 2 TIMES DAILY
Qty: 90 TABLET | Refills: 1 | Status: SHIPPED | OUTPATIENT
Start: 2025-03-05

## 2025-03-13 DIAGNOSIS — E03.8 ADULT ONSET HYPOTHYROIDISM: ICD-10-CM

## 2025-03-13 RX ORDER — LEVOTHYROXINE SODIUM 137 UG/1
137 TABLET ORAL DAILY
Qty: 90 TABLET | Refills: 2 | Status: SHIPPED | OUTPATIENT
Start: 2025-03-13

## 2025-04-22 NOTE — PROGRESS NOTES
Assessment & Plan  Muscular chest pain.  Pain is localized along the side, not extending down, and is not associated with lumps. A recent mammogram was normal, and no trauma to the area has been reported. The likelihood of the pain being muscular was discussed, possibly due to new part-time job activities involving mopping. Gentle stretching exercises, particularly for the upper thorax, were recommended to be performed before physical activities.    Chronic cough.  The patient has had a persistent cough since February, with no significant chest discomfort and no productive cough. A chest x-ray from December 2024 was reviewed. The patient was advised to start taking over-the-counter Zyrtec at night due to its potential sedative effects. If there is no improvement in symptoms within a few weeks, a follow-up is needed to consider alternative medications such as Singulair.             Patient was given instructions and counseling regarding her condition or for health maintenance advice. Please see specific information pulled into the AVS if appropriate.          Micheline is a 70 y.o. being seen today for  Pain (Left breast)   HISTORY    HPI      The patient is a 70-year-old female who presents for evaluation of chest pain and cough.    Discomfort in the left chest region under the armpit has been reported, which has been intermittent over the past two weeks. The pain is localized to the left side of the chest and does radiate downwards. No trauma to the area has been experienced. 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 Speculation suggests that this activity may be contributing to the symptoms. No presence of lumps or pulling sensation in the affected area is reported. A mammogram was performed approximately one to two months ago, yielding normal results.    A persistent cough has also been reported, which was previously discussed during the last visit in  02/2025. The cough is non-productive and does not cause any chest discomfort. It is present throughout the day and is somewhat alleviated by the use of cough drops. A history of allergies is noted, but no routine medications such as Zyrtec are currently being taken. No associated wheezing is reported.     Social History  She  reports that she has never smoked. She has never used smokeless tobacco. She reports that she does not drink alcohol and does not use drugs.  EXAM DATA    Vital Signs        BP Readings from Last 1 Encounters:   04/23/25 130/78     Wt Readings from Last 3 Encounters:   04/23/25 81.2 kg (179 lb)   02/14/25 76.7 kg (169 lb)   12/28/24 76.7 kg (169 lb)   Body mass index is 30.73 kg/m².  Physical Exam      General Appearance: Normal.  Breast: Left : Normal appearance, no masses or tenderness, no nipple retraction, dimpling, or discharge.  Respiratory: Within normal limits.  Cardiovascular: Within normal limits.  Skin: Warm and dry, no rash.  Musculoskeletal: Non-tender, no pain to palpation or breathing  Psychiatric: Normal judgement and affect.            Patient or patient representative verbalized consent for the use of Ambient Listening during the visit with  Mariela Blum MD for chart documentation. 4/23/2025  12:57 EDT

## 2025-04-23 ENCOUNTER — OFFICE VISIT (OUTPATIENT)
Dept: FAMILY MEDICINE CLINIC | Facility: CLINIC | Age: 70
End: 2025-04-23
Payer: MEDICARE

## 2025-04-23 VITALS
SYSTOLIC BLOOD PRESSURE: 130 MMHG | WEIGHT: 179 LBS | DIASTOLIC BLOOD PRESSURE: 78 MMHG | OXYGEN SATURATION: 98 % | RESPIRATION RATE: 18 BRPM | HEART RATE: 58 BPM | BODY MASS INDEX: 30.56 KG/M2 | HEIGHT: 64 IN

## 2025-04-23 DIAGNOSIS — R07.89 OTHER CHEST PAIN: Primary | ICD-10-CM

## 2025-04-23 DIAGNOSIS — R05.3 CHRONIC COUGH: ICD-10-CM

## 2025-05-07 DIAGNOSIS — R05.3 CHRONIC COUGH: Primary | ICD-10-CM

## 2025-05-07 RX ORDER — MONTELUKAST SODIUM 10 MG/1
10 TABLET ORAL NIGHTLY
Qty: 90 TABLET | Refills: 1 | Status: SHIPPED | OUTPATIENT
Start: 2025-05-07

## 2025-05-09 ENCOUNTER — EXTERNAL PBMM DATA (OUTPATIENT)
Dept: PHARMACY | Facility: OTHER | Age: 70
End: 2025-05-09
Payer: MEDICARE

## 2025-05-30 NOTE — TELEPHONE ENCOUNTER
Caller: Micheline Cervantes    Relationship: Self    Best call back number:     What is the medical concern/diagnosis:     What specialty or service is being requested: COLONOSCOPY    What is the provider, practice or medical service name: [facility]    What is the office location:     What is the office phone number:     Any additional details: PATIENT IS CALLING IN TO GET A REFERRAL FOR A COLONOSCOPY        
Patient tolerated procedure well.

## 2025-06-05 DIAGNOSIS — E11.9 CONTROLLED TYPE 2 DIABETES MELLITUS WITHOUT COMPLICATION, WITHOUT LONG-TERM CURRENT USE OF INSULIN: ICD-10-CM

## 2025-06-05 RX ORDER — METOPROLOL TARTRATE 50 MG
50 TABLET ORAL 2 TIMES DAILY
Qty: 60 TABLET | Refills: 0 | Status: SHIPPED | OUTPATIENT
Start: 2025-06-05

## 2025-06-05 RX ORDER — METFORMIN HYDROCHLORIDE 750 MG/1
750 TABLET, EXTENDED RELEASE ORAL
Qty: 30 TABLET | Refills: 0 | Status: SHIPPED | OUTPATIENT
Start: 2025-06-05

## 2025-06-05 NOTE — TELEPHONE ENCOUNTER
Last visit 4/23/25  No f/u scheduled     New pcp appt 6/23/25     Providers


Date of admission: 


10/02/23 09:55





Expected date of discharge: 10/04/23


Attending physician: 


Francesca Schmidt





Primary care physician: 


Alfredito Zuniga








- Discharge Diagnosis(es)


(1) Diabetes in pregnancy


Current Visit: Yes   Status: Acute   





(2) Term pregnancy


Current Visit: No   Status: Acute   





(3) S/P  section


With bilateral salpingectomy


Current Visit: No   Status: Acute   


Hospital Course: 





30-year-old  3 now para  that presented to labor and delivery on 10/2

for scheduled repeat  section with bilateral salpingectomy.  Patient 

receiving routine prenatal care with myself which is been contacted by diagnosis

of pre-existing diabetes.  Patient did see maternal fetal medicine and her 

metformin was increased to 2000 mg daily.  Patient did reasonably well with her 

blood sugars.  Patient underwent  testing given diagnosis of 

gestational diabetes on metformin.  For full details on this patient please the 

dictated history and physical.


Patient was admitted and repeat  section with bilateral salpingectomy 

was completed without difficulty.  For full details on the procedure please see 

the operative report.  Patient delivered a viable female infant, weight of 8 

lbs. 12 oz., Apgars of 9 and 9 at one and 5 minutes respectively.  Patient's 

postoperative course has been uneventful.  On this postoperative day #2 she is 

ambulating and voiding without difficulty.  She is tolerating a regular diet 

without nausea or vomiting.  She states her pain is well-controlled.  She denies

concerns and would like discharge home.


Patient Condition at Discharge: Good





Plan - Discharge Summary


Discharge Rx Participant: Yes


New Discharge Prescriptions: 


No Action


   Pnv No.95/Ferrous Fum/Folic AC [Prenatal Multivitamin Tablet] 1 tab PO DAILY


   metFORMIN HCL 1,000 mg PO BID


   Aspirin [Adult Low Dose Aspirin EC] 81 mg PO DAILY


Discharge Medication List





Pnv No.95/Ferrous Fum/Folic AC [Prenatal Multivitamin Tablet] 1 tab PO DAILY 

19 [History]


Aspirin [Adult Low Dose Aspirin EC] 81 mg PO DAILY 23 [History]


metFORMIN HCL 1,000 mg PO BID 23 [History]








Follow up Appointment(s)/Referral(s): 


Francesca Schmdit DO [Doctor of Osteopathic Medicine] - 2 Weeks


Patient Instructions/Handouts:   (DC),  (GEN)


Activity/Diet/Wound Care/Special Instructions: 


Tub baths or intercourse until 6 weeks postoperatively.  Patient is coming MAKE 

a routine postoperative appointment 2 weeks.  Bleeding cautions were reviewed.  

Over-the-counter ibuprofen as needed for pain.


Discharge Disposition: HOME SELF-CARE

## 2025-06-22 DIAGNOSIS — I10 BENIGN ESSENTIAL HYPERTENSION: ICD-10-CM

## 2025-06-23 RX ORDER — AMLODIPINE BESYLATE 10 MG/1
10 TABLET ORAL DAILY
Qty: 90 TABLET | Refills: 1 | OUTPATIENT
Start: 2025-06-23

## 2025-07-01 RX ORDER — METOPROLOL TARTRATE 50 MG
50 TABLET ORAL 2 TIMES DAILY
Qty: 180 TABLET | Refills: 1 | Status: SHIPPED | OUTPATIENT
Start: 2025-07-01

## 2025-07-03 DIAGNOSIS — E11.9 CONTROLLED TYPE 2 DIABETES MELLITUS WITHOUT COMPLICATION, WITHOUT LONG-TERM CURRENT USE OF INSULIN: ICD-10-CM

## 2025-07-03 RX ORDER — METFORMIN HYDROCHLORIDE 750 MG/1
750 TABLET, EXTENDED RELEASE ORAL
Qty: 30 TABLET | Refills: 0 | Status: SHIPPED | OUTPATIENT
Start: 2025-07-03

## 2025-07-17 ENCOUNTER — OFFICE VISIT (OUTPATIENT)
Dept: FAMILY MEDICINE CLINIC | Facility: CLINIC | Age: 70
End: 2025-07-17
Payer: MEDICARE

## 2025-07-17 VITALS
BODY MASS INDEX: 30.73 KG/M2 | DIASTOLIC BLOOD PRESSURE: 90 MMHG | OXYGEN SATURATION: 97 % | HEIGHT: 64 IN | WEIGHT: 180 LBS | SYSTOLIC BLOOD PRESSURE: 152 MMHG | HEART RATE: 58 BPM

## 2025-07-17 DIAGNOSIS — Z00.00 GENERAL MEDICAL EXAM: Primary | ICD-10-CM

## 2025-07-17 DIAGNOSIS — I10 BENIGN ESSENTIAL HYPERTENSION: Chronic | ICD-10-CM

## 2025-07-17 DIAGNOSIS — E11.9 CONTROLLED TYPE 2 DIABETES MELLITUS WITHOUT COMPLICATION, WITHOUT LONG-TERM CURRENT USE OF INSULIN: Chronic | ICD-10-CM

## 2025-07-17 DIAGNOSIS — M85.80 OTHER SPECIFIED DISORDERS OF BONE DENSITY AND STRUCTURE, UNSPECIFIED SITE: ICD-10-CM

## 2025-07-17 DIAGNOSIS — Z78.0 ASYMPTOMATIC MENOPAUSAL STATE: ICD-10-CM

## 2025-07-17 DIAGNOSIS — M21.619 BUNION: ICD-10-CM

## 2025-07-17 DIAGNOSIS — G47.30 SLEEP APNEA, UNSPECIFIED TYPE: Chronic | ICD-10-CM

## 2025-07-17 RX ORDER — AMLODIPINE BESYLATE 10 MG/1
10 TABLET ORAL DAILY
Qty: 90 TABLET | Refills: 1 | Status: SHIPPED | OUTPATIENT
Start: 2025-07-17

## 2025-07-17 RX ORDER — FLUTICASONE PROPIONATE 50 MCG
2 SPRAY, SUSPENSION (ML) NASAL DAILY
Qty: 16 G | Refills: 0 | Status: SHIPPED | OUTPATIENT
Start: 2025-07-17

## 2025-07-17 RX ORDER — FLUTICASONE FUROATE 100 UG/1
1 POWDER RESPIRATORY (INHALATION) DAILY
Qty: 30 EACH | Refills: 1 | Status: SHIPPED | OUTPATIENT
Start: 2025-07-17

## 2025-07-17 NOTE — PROGRESS NOTES
Chief Complaint  Establish Care (Vitamin d concerns, )    Subjective        Micheline Cervantes presents to [facility]       The patient is a 70-year-old female who presents to the clinic to establish care.    She has been under the consistent care of Dr. Blum. She is currently taking vitamin D supplements but is uncertain about the correct dosage. She has been taking 2 pills of 2000 international units daily. She has a history of high blood pressure, high cholesterol, low thyroid, and type 2 diabetes, which is well-managed. She occasionally experiences acid reflux but reports no difficulty swallowing. She has bunions, a condition that runs in her family, and is considering surgery. She has not had her feet checked recently but was advised to do so. She has sleep apnea but does not use a CPAP machine. She tried it once but found it uncomfortable. Her last sleep study was conducted some time ago. She has had a bone density screen in the past but believes it is time for another one. She received a shingles shot in 2017 and a pneumonia shot in 2023. She has asthma, which was recently diagnosed, but has not had any episodes. She has allergies and a persistent cough, which worsened after lung surgery. Steroid treatment did not alleviate her cough. She has not tried using a daily inhaler for her cough. She had a chest x-ray in December 2024. She is requesting refills for her amlodipine. She used to monitor her blood pressure at home but stopped as it was consistently normal. She lives alone and has a cardiologist. She recently saw him due to an extra heartbeat detected by Dr. Blum, but he found everything to be normal. She has not had any issues since then. She is unsure if her diabetes doctor accepts her insurance. She has not seen him for a long time. She reports no headaches, dizziness, or visual changes. She sees an eye doctor annually. She has not seen a dentist recently as her previous  "one retired and she has been unable to find another who accepts Medicare. She reports no chest pain, shortness of breath, nausea, vomiting, diarrhea, constipation, or urinary concerns. She reports no urinary urgency, frequency, or leakage when coughing, sneezing, or jumping. She reports no sexual health concerns. She declined STD testing today. She had a hysterectomy and has one ovary remaining. She reports no rashes, skin changes, lumps, bumps, joint aches, or pains that limit her activity. She has difficulty sleeping and often feels tired but unable to sleep. She wakes up at 5:00 AM every morning and sometimes can not fall back asleep after using the restroom. She feels sleepy throughout the day. She sometimes experiences eye swelling when she does not get enough sleep. She uses a cream with olive oil for skincare. She eats out most of the time, consuming burgers, salads, chicken, vegetables, lemonade, and cranberry juice. She walks a lot at work but does not exercise. She had a fall in May 2024 where she fell on her face and injured her knees. She could not walk and see out of her eye due to swelling. Her sleep was affected after the fall, but it has improved now. She has flashbacks from the incident but is managing well without therapy. She has a good social support system from her family.    SOCIAL HISTORY  She works part-time at [employer].    FAMILY HISTORY  Her grandmother had bunions.  Her father had diabetes.    ALLERGIES  She is allergic to CONTRAST and STATINS.    MEDICATIONS  Current: irbesartan hydrochlorothiazide 300-12.5 mg daily, Synthroid 137 mcg daily, amlodipine 10 mg daily, metoprolol 50 mg twice daily, multivitamin, magnesium, milk thistle, coenzyme Q10    IMMUNIZATIONS  She received a shingles vaccine in 2017.  She received a pneumonia vaccine in 2023.    History of Present Illness    Objective   Vital Signs:  /90   Pulse 58   Ht 162.6 cm (64\")   Wt 81.6 kg (180 lb)   SpO2 97%   BMI 30.90 " "kg/m²   Estimated body mass index is 30.9 kg/m² as calculated from the following:    Height as of this encounter: 162.6 cm (64\").    Weight as of this encounter: 81.6 kg (180 lb).            Physical Exam  Constitutional:       Appearance: Normal appearance.   HENT:      Head: Normocephalic and atraumatic.      Nose: Nose normal.      Mouth/Throat:      Mouth: Mucous membranes are moist.   Eyes:      General: Lids are normal.      Conjunctiva/sclera: Conjunctivae normal.   Cardiovascular:      Rate and Rhythm: Normal rate and regular rhythm.      Heart sounds: Normal heart sounds.   Pulmonary:      Effort: Pulmonary effort is normal.      Breath sounds: Normal breath sounds.   Musculoskeletal:      Cervical back: Normal range of motion.   Skin:     General: Skin is warm and dry.   Neurological:      General: No focal deficit present.      Mental Status: She is alert and oriented to person, place, and time.      Gait: Gait is intact.   Psychiatric:         Mood and Affect: Mood normal.         Behavior: Behavior normal.         Thought Content: Thought content normal.        Result Review :               Results  Laboratory Studies  A1c is less than 7.    Imaging  Chest x-ray in December 2024 showed surgical clips around the windpipe from previous thyroid surgery, no abnormality of the lung fields.       Assessment and Plan        1. Establishment of care.  Her blood pressure readings from December 2024 and today are uncontrolled, while those from April 2025 and February 2025 were within control. Her diabetes is well-managed with an A1c consistently below 7 for the past 5 years. A chest x-ray conducted in December 2024 revealed no abnormalities in the lung fields. She is due for a bone density test and a foot examination due to her diabetic condition. Her last bone density test was in 2021. She received a shingles vaccine in 2017 and a pneumonia vaccine in 2023. Her current medications include irbesartan " hydrochlorothiazide 300-12.5 mg daily, Synthroid 137 mcg daily, amlodipine 10 mg daily, metoprolol 50 mg twice daily, multivitamin, magnesium, milk thistle, and coenzyme Q10. She is also taking vitamin D supplements at a dosage of 2000 international units twice daily. A referral to a podiatrist will be made for her bunions. A referral to a sleep medicine specialist will be arranged for her sleep apnea. A prescription for the new shingles vaccine will be sent to her pharmacy. A bone density test will be ordered. Her vitamin D level will be checked to ensure it remains within the normal range. A urine test will be conducted to check for proteinuria. Her amlodipine prescription will be renewed.    2. Asthma.  She has been experiencing a chronic cough, which may be due to her asthma. A prescription for Flovent inhaler will be provided, with instructions to take one puff in the morning and one at night. A prescription for Flonase nasal spray will also be provided, with instructions to use one spray twice daily or two sprays once daily.    3. Hypertension.  Her blood pressure readings from December 2024 and today are uncontrolled. She is advised to monitor her blood pressure at home once or twice a week for the next 4 weeks and keep a record of the readings.    4. Type 2 Diabetes Mellitus.  Her diabetes is well-controlled with an A1c consistently below 7 for the past 5 years. Diabetes labs will be conducted today.    5. Sleep Apnea.  She does not currently use a CPAP machine and has not had a sleep study since before the COVID-19 pandemic. A referral to a sleep medicine specialist will be arranged.    6. Vitamin D Deficiency.  She is currently taking 4000 international units of vitamin D daily. The recommendation is to reduce the dosage to 2000 international units daily. Her vitamin D levels will be checked to ensure they are within the normal range.    7. Allergic Rhinitis.  She reports a chronic cough exacerbated by  allergies. A prescription for Flonase nasal spray will be provided, with instructions to use one spray twice daily or two sprays once daily.    8. Health Maintenance.  She is due for a bone density test and a shingles vaccine. A bone density test will be ordered. A prescription for the new shingles vaccine will be sent to her pharmacy.    Diagnoses and all orders for this visit:    1. Bunion (Primary)  -     Ambulatory Referral to Podiatry    2. Controlled type 2 diabetes mellitus without complication, without long-term current use of insulin  -     Hemoglobin A1c  -     Microalbumin / Creatinine Urine Ratio - Urine, Clean Catch    3. Benign essential hypertension  -     amLODIPine (NORVASC) 10 MG tablet; Take 1 tablet by mouth Daily.  Dispense: 90 tablet; Refill: 1    4. Sleep apnea, unspecified type  -     Ambulatory Referral to Sleep Medicine    5. Asymptomatic menopausal state  -     DEXA Bone Density Axial  -     Vitamin D,25-Hydroxy    6. Other specified disorders of bone density and structure, unspecified site  -     Vitamin D,25-Hydroxy    Other orders  -     Zoster Vac Recomb Adjuvanted 50 MCG/0.5ML reconstituted suspension; Inject 0.5 mL into the appropriate muscle as directed by prescriber 1 (One) Time for 1 dose.  Dispense: 1 each; Refill: 0  -     Fluticasone Furoate (Arnuity Ellipta) 100 MCG/ACT aerosol powder ; Inhale 1 puff Daily.  Dispense: 30 each; Refill: 1  -     fluticasone (FLONASE) 50 MCG/ACT nasal spray; Administer 2 sprays into the nostril(s) as directed by provider Daily.  Dispense: 16 g; Refill: 0             Follow Up   Return in about 2 months (around 9/22/2025) for Medicare Wellness.  Patient was given instructions and counseling regarding her condition or for health maintenance advice. Please see specific information pulled into the AVS if appropriate.     Patient or patient representative verbalized consent for the use of Ambient Listening during the visit with  Carmen Oneil MD for  chart documentation. 7/17/2025  15:17 EDT

## 2025-07-17 NOTE — PATIENT INSTRUCTIONS
Sleep/fatigue plan:  - CBT-I is the most effective treatment for trouble sleeping according to research. You can try it for free at your own pace using this free edwige from the [facility]: https://Updater.va.EMCAS/edwige/cbt-i-#:~:text=CBT%2Di%20Coach%20is%20based,[facility]%20[facility]%20[facility].  - Keep a consistent sleep schedule. Get up at the same time every day, even on weekends or during vacations.  - Set a bedtime that is early enough for you to get at least 7-8 hours of sleep.  - Don’t go to bed unless you are sleepy.  - If you don’t fall asleep after 20 minutes, get out of bed. Go do a quiet activity without a lot of light exposure. It is especially important to not get on electronics.  - Establish a relaxing bedtime routine.    You can look at [employer] for affordable skin care products - a serum containing caffeine and/or vitamin C may be helpful for your under-eye concerns. You can use a retinoid product as well, but be careful of sun exposure and wear SPF with using it.    Start flonase for allergies.    Try flovent inhaler for cough given asthma.     - Use your bed only for sleep and sex.  - Make your bedroom quiet and relaxing. Keep the room at a comfortable, cool temperature.  - Limit exposure to bright light in the evenings.  - Turn off electronic devices at least 30 minutes before bedtime.  - Don’t eat a large meal before bedtime. If you are hungry at night, eat a light, healthy snack.  - Exercise regularly and maintain a healthy diet.  - Avoid consuming caffeine in the afternoon or evening.  - Avoid consuming alcohol before bedtime.  - Reduce your fluid intake before bedtime.  https://sleepeducation.org/healthy-sleep/healthy-sleep-habits/

## 2025-07-18 LAB
25(OH)D3+25(OH)D2 SERPL-MCNC: 15.9 NG/ML (ref 30–100)
ALBUMIN/CREAT UR: 10 MG/G CREAT (ref 0–29)
CREAT UR-MCNC: 170.3 MG/DL
HBA1C MFR BLD: 6.5 % (ref 4.8–5.6)
MICROALBUMIN UR-MCNC: 17.3 UG/ML

## 2025-07-22 DIAGNOSIS — I10 BENIGN ESSENTIAL HYPERTENSION: ICD-10-CM

## 2025-07-22 RX ORDER — IRBESARTAN AND HYDROCHLOROTHIAZIDE 300; 12.5 MG/1; MG/1
1 TABLET, FILM COATED ORAL DAILY
Qty: 90 TABLET | Refills: 1 | Status: SHIPPED | OUTPATIENT
Start: 2025-07-22

## 2025-08-06 DIAGNOSIS — E11.9 CONTROLLED TYPE 2 DIABETES MELLITUS WITHOUT COMPLICATION, WITHOUT LONG-TERM CURRENT USE OF INSULIN: ICD-10-CM

## 2025-08-06 RX ORDER — METFORMIN HYDROCHLORIDE 750 MG/1
750 TABLET, EXTENDED RELEASE ORAL
Qty: 30 TABLET | Refills: 0 | Status: SHIPPED | OUTPATIENT
Start: 2025-08-06

## (undated) DEVICE — TUBING, SUCTION, 1/4" X 10', STRAIGHT: Brand: MEDLINE

## (undated) DEVICE — KT ORCA ORCAPOD DISP STRL

## (undated) DEVICE — LN SMPL CO2 SHTRM SD STREAM W/M LUER

## (undated) DEVICE — BITEBLOCK OMNI BLOC

## (undated) DEVICE — FRCP BX RADJAW4 NDL 2.8 240CM LG OG BX40

## (undated) DEVICE — Device: Brand: DEFENDO AIR/WATER/SUCTION AND BIOPSY VALVE

## (undated) DEVICE — SINGLE-USE BIOPSY FORCEPS: Brand: RADIAL JAW 4

## (undated) DEVICE — SENSR O2 OXIMAX FNGR A/ 18IN NONSTR

## (undated) DEVICE — TBG 02 CRUSH RESIST LF CLR 7FT

## (undated) DEVICE — ADAPT CLN BIOGUARD AIR/H2O DISP

## (undated) DEVICE — CANN NASL CO2 TRULINK W/O2 A/

## (undated) DEVICE — CANN O2 ETCO2 FITS ALL CONN CO2 SMPL A/ 7IN DISP LF